# Patient Record
Sex: MALE | Race: BLACK OR AFRICAN AMERICAN | NOT HISPANIC OR LATINO | Employment: PART TIME | URBAN - METROPOLITAN AREA
[De-identification: names, ages, dates, MRNs, and addresses within clinical notes are randomized per-mention and may not be internally consistent; named-entity substitution may affect disease eponyms.]

---

## 2017-03-28 ENCOUNTER — HOSPITAL ENCOUNTER (EMERGENCY)
Facility: HOSPITAL | Age: 42
Discharge: HOME | End: 2017-03-28
Attending: EMERGENCY MEDICINE
Payer: MEDICAID

## 2017-03-28 VITALS
RESPIRATION RATE: 18 BRPM | SYSTOLIC BLOOD PRESSURE: 160 MMHG | OXYGEN SATURATION: 100 % | TEMPERATURE: 99 F | HEIGHT: 66 IN | DIASTOLIC BLOOD PRESSURE: 100 MMHG | BODY MASS INDEX: 29.8 KG/M2 | WEIGHT: 185.44 LBS | HEART RATE: 80 BPM

## 2017-03-28 DIAGNOSIS — N49.2 SCROTAL WALL ABSCESS: Primary | ICD-10-CM

## 2017-03-28 PROCEDURE — 99283 EMERGENCY DEPT VISIT LOW MDM: CPT

## 2017-03-28 PROCEDURE — 99284 EMERGENCY DEPT VISIT MOD MDM: CPT | Mod: ,,, | Performed by: EMERGENCY MEDICINE

## 2017-03-28 RX ORDER — SULFAMETHOXAZOLE AND TRIMETHOPRIM 800; 160 MG/1; MG/1
1 TABLET ORAL 2 TIMES DAILY
Qty: 14 TABLET | Refills: 0 | Status: SHIPPED | OUTPATIENT
Start: 2017-03-28 | End: 2017-04-04

## 2017-03-28 NOTE — DISCHARGE INSTRUCTIONS
Our goal in the emergency department is to always give you outstanding care and exceptional service. You may receive a survey by mail or e-mail in the next week regarding your experience in our ED. We would greatly appreciate your completing and returning the survey. Your feedback provides us with a way to recognize our staff who give very good care and it helps us learn how to improve when your experience was below our aspiration of excellence.     APPLY WARM COMPRESSES TO THE AREA FOR 20-30 MINUTES 3-4 TIMES A DAY.    RETURN IF ANY INCREASED SWELLING, PAIN, REDNESS OR WARMTH.

## 2017-03-28 NOTE — ED PROVIDER NOTES
Encounter Date: 3/28/2017    SCRIBE #1 NOTE: I, Christophe Lujan, am scribing for, and in the presence of, Reagan Strange MD.       History     Chief Complaint   Patient presents with    Abscess     r groin     Review of patient's allergies indicates:  No Known Allergies  HPI Comments: Time seen by provider: 10:42 AM  Note: Attempted to see the pt at 10:30 AM, but he had left the room after only being roomed for 15 minutes.     This is a 42 y.o. male who presents with complaint of scrotal abscess for 2 day duration. Pt had shaved 3 days ago, and first noticed a small bump two days ago. Now he endorses bleeding from the abscess site. Denies any fever, chills or discharge. No further complaints or concerns at this time.         The history is provided by the patient and medical records.    note after I attempted to see the patient and he was not in the room, I called him on his cell phone.  He was outside the emergency department activity unclear but agreed to come back into the emergency department for further evaluation.  Past Medical History:   Diagnosis Date    Asthma      History reviewed. No pertinent surgical history.  History reviewed. No pertinent family history.  Social History   Substance Use Topics    Smoking status: Never Smoker    Smokeless tobacco: None    Alcohol use Yes      Comment: about 5 times a month, none today     Review of Systems   Constitutional: Negative for chills and fever.   Genitourinary: Negative for difficulty urinating, discharge, dysuria and frequency.        (+) Scrotum abscess with bleeding. No discharge       Physical Exam   Initial Vitals   BP Pulse Resp Temp SpO2   03/28/17 0912 03/28/17 0912 03/28/17 0912 03/28/17 0912 03/28/17 0912   160/100 80 18 98.5 °F (36.9 °C) 100 %     Physical Exam    Nursing note and vitals reviewed.  Abdominal: Soft. He exhibits no distension. There is no tenderness. There is no rebound and no guarding.   Genitourinary:   Genitourinary Comments:  Uncircumcised male. Testes are both down and normal. Pt has a small 1/4 cm cyst/folliculitis on the right scrotal wall, minimally tender, no erythema or fluctuance. No cellulitis or regional adenitis. No other lesions are seen in the genital area.    Skin: Skin is warm and dry.         ED Course   Procedures  Labs Reviewed - No data to display          Medical Decision Making:   History:   Old Medical Records: I decided to obtain old medical records.  Initial Assessment:   Given that the pt has recently shaved, this is likely an ingrown hair with a small abscess. Will treat with warm compresses and oral antibiotics. Skin is warm and dry, he is well developed well nourished.               Scribe Attestation:   Scribe #1: I performed the above scribed service and the documentation accurately describes the services I performed. I attest to the accuracy of the note.    Attending Attestation:           Physician Attestation for Scribe:  Physician Attestation Statement for Scribe #1: I, Reagan Strange MD, reviewed documentation, as scribed by Christophe Lujan in my presence, and it is both accurate and complete.                 ED Course     Clinical Impression:   The encounter diagnosis was Scrotal wall abscess.    Disposition:   Disposition: Discharged  Condition: Stable       Reagan Strange MD  03/29/17 1006

## 2017-03-28 NOTE — ED NOTES
Patient identifiers verified and correct for Mr Douglass    C/C: bump on testicle  APPEARANCE: awake and alert in NAD.  SKIN: warm, dry and intact. No breakdown or bruising.  MUSCULOSKELETAL: Patient moving all extremities spontaneously, no obvious swelling or deformities noted. Ambulates independently.  RESPIRATORY: no shortness of breath. Resp unlabored  CARDIAC: heart tones normal. Regular rate and rhythm; 2+ distal pulses; no peripheral edema  ABDOMEN: S/ND/NT, normoactive bowel sounds present in all four quadrants. Normal stool pattern.  : voids spontaneously without difficulty. Denies difficulty  Neurologic: AAO x 4; follows commands equal strength in all extremities; denies numbness/tingling.

## 2017-03-28 NOTE — ED AVS SNAPSHOT
[facility]  [address]  [facility] [address]               Magan Douglass   3/28/2017 10:08 AM   ED    Description:  Male : 1975   Department:  [facility]           Your Care was Coordinated By:     Provider Role From To    Reagan Strange MD Attending Provider 17 1027 --      Reason for Visit     Abscess           Diagnoses this Visit        Comments    Scrotal wall abscess    -  Primary       ED Disposition     ED Disposition Condition Comment    Discharge             To Do List           Follow-up Information     Follow up with [facility] In 3 days.    Why:  If symptoms worsen    Contact information:    [address] [facility] [address]  [phone]         These Medications        Disp Refills Start End    sulfamethoxazole-trimethoprim 800-160mg (BACTRIM DS) 800-160 mg Tab 14 tablet 0 3/28/2017 2017    Take 1 tablet by mouth 2 (two) times daily. - Oral      [facility]     [facility] Care [facility] -  Assistance  Registered nurses in the [facility] provide clinical advisement, health education, appointment booking, and other advisory services.  Call for this free service at [phone].             Medications           Message regarding Medications     Verify the changes and/or additions to your medication regime listed below are the same as discussed with your clinician today.  If any of these changes or additions are incorrect, please notify your healthcare provider.        START taking these NEW medications        Refills    sulfamethoxazole-trimethoprim 800-160mg (BACTRIM DS) 800-160 mg Tab 0    Sig: Take 1 tablet by mouth 2 (two) times daily.    Class: Print    Route: Oral           Verify that the below list of medications is an accurate representation of the medications you are currently taking.  If none reported, the list may be blank. If incorrect, please  "contact your healthcare provider. Carry this list with you in case of emergency.           Current Medications     sulfamethoxazole-trimethoprim 800-160mg (BACTRIM DS) 800-160 mg Tab Take 1 tablet by mouth 2 (two) times daily.           Clinical Reference Information           Your Vitals Were     BP Pulse Temp Resp Height Weight    160/100 80 98.5 °F (36.9 °C) (Oral) 18 5' 6" (1.676 m) 84.1 kg (185 lb 6.5 oz)    SpO2 BMI             100% 29.93 kg/m2         Allergies as of 3/28/2017     No Known Allergies      Immunizations Administered on Date of Encounter - 3/28/2017     None      ED Micro, Lab, POCT     None      ED Imaging Orders     None        Discharge Instructions       Our goal in the emergency department is to always give you outstanding care and exceptional service. You may receive a survey by mail or e-mail in the next week regarding your experience in our ED. We would greatly appreciate your completing and returning the survey. Your feedback provides us with a way to recognize our staff who give very good care and it helps us learn how to improve when your experience was below our aspiration of excellence.     APPLY WARM COMPRESSES TO THE AREA FOR 20-30 MINUTES 3-4 TIMES A DAY.    RETURN IF ANY INCREASED SWELLING, PAIN, REDNESS OR WARMTH.      Discharge References/Attachments     ABSCESS, ANTIBIOTIC TREATMENT ONLY (ENGLISH)      [facility] Sign-Up     Activating your [facility] account is as easy as 1-2-3!     1) Visit [email or web address], select Sign Up Now, enter this activation code and your date of birth, then select Next.  [email or web address]  Expires: 5/12/2017 10:53 AM      2) Create a username and password to use when you visit [facility] in the future and select a security question in case you lose your password and select Next.    3) Enter your e-mail address and click Sign Up!    Additional Information  If you have questions, please e-mail [email or web address] or call [phone] to talk to our " [facility] staff. Remember, [facility] is NOT to be used for urgent needs. For medical emergencies, dial 911.          [facility] complies with applicable Federal civil rights laws and does not discriminate on the basis of race, color, national origin, age, disability, or sex.        Language Assistance Services     ATTENTION: Language assistance services are available, free of charge. Please call [phone].      ATENCIÓN: Si habla manolo, tiene a link disposición servicios gratuitos de asistencia lingüística. Llame al [phone].     CHÚ Ý: N?u b?n nói Ti?ng Vi?t, có các d?ch v? h? tr? ngôn ng? mi?n phí dành cho b?n. G?i s? [phone].

## 2017-06-15 ENCOUNTER — HOSPITAL ENCOUNTER (EMERGENCY)
Facility: HOSPITAL | Age: 42
Discharge: HOME | End: 2017-06-16
Attending: EMERGENCY MEDICINE
Payer: MEDICAID

## 2017-06-15 DIAGNOSIS — E86.0 DEHYDRATION: Primary | ICD-10-CM

## 2017-06-15 LAB
BASOPHILS # BLD AUTO: 0.01 K/UL
BASOPHILS NFR BLD: 0.1 %
DIFFERENTIAL METHOD: ABNORMAL
EOSINOPHIL # BLD AUTO: 0.1 K/UL
EOSINOPHIL NFR BLD: 1.1 %
ERYTHROCYTE [DISTWIDTH] IN BLOOD BY AUTOMATED COUNT: 12.9 %
HCT VFR BLD AUTO: 51 %
HGB BLD-MCNC: 18.2 G/DL
LYMPHOCYTES # BLD AUTO: 3.6 K/UL
LYMPHOCYTES NFR BLD: 34.5 %
MCH RBC QN AUTO: 32.2 PG
MCHC RBC AUTO-ENTMCNC: 35.7 %
MCV RBC AUTO: 90 FL
MONOCYTES # BLD AUTO: 0.9 K/UL
MONOCYTES NFR BLD: 8.9 %
NEUTROPHILS # BLD AUTO: 5.7 K/UL
NEUTROPHILS NFR BLD: 55.3 %
PLATELET # BLD AUTO: 302 K/UL
PMV BLD AUTO: 10.7 FL
RBC # BLD AUTO: 5.65 M/UL
WBC # BLD AUTO: 10.3 K/UL

## 2017-06-15 PROCEDURE — 96360 HYDRATION IV INFUSION INIT: CPT

## 2017-06-15 PROCEDURE — 80053 COMPREHEN METABOLIC PANEL: CPT

## 2017-06-15 PROCEDURE — 99284 EMERGENCY DEPT VISIT MOD MDM: CPT | Mod: ,,, | Performed by: PHYSICIAN ASSISTANT

## 2017-06-15 PROCEDURE — 96361 HYDRATE IV INFUSION ADD-ON: CPT

## 2017-06-15 PROCEDURE — 85025 COMPLETE CBC W/AUTO DIFF WBC: CPT

## 2017-06-15 PROCEDURE — 83690 ASSAY OF LIPASE: CPT

## 2017-06-15 PROCEDURE — 99283 EMERGENCY DEPT VISIT LOW MDM: CPT | Mod: 25

## 2017-06-16 VITALS
SYSTOLIC BLOOD PRESSURE: 163 MMHG | HEIGHT: 66 IN | BODY MASS INDEX: 30.53 KG/M2 | DIASTOLIC BLOOD PRESSURE: 107 MMHG | HEART RATE: 64 BPM | WEIGHT: 190 LBS | RESPIRATION RATE: 18 BRPM | OXYGEN SATURATION: 98 % | TEMPERATURE: 98 F

## 2017-06-16 LAB
ALBUMIN SERPL BCP-MCNC: 4.8 G/DL
ALP SERPL-CCNC: 79 U/L
ALT SERPL W/O P-5'-P-CCNC: 50 U/L
ANION GAP SERPL CALC-SCNC: 12 MMOL/L
AST SERPL-CCNC: 36 U/L
BACTERIA #/AREA URNS AUTO: ABNORMAL /HPF
BILIRUB SERPL-MCNC: 1.2 MG/DL
BILIRUB UR QL STRIP: NEGATIVE
BUN SERPL-MCNC: 40 MG/DL
CALCIUM SERPL-MCNC: 10.9 MG/DL
CHLORIDE SERPL-SCNC: 101 MMOL/L
CLARITY UR REFRACT.AUTO: ABNORMAL
CO2 SERPL-SCNC: 24 MMOL/L
COLOR UR AUTO: YELLOW
CREAT SERPL-MCNC: 1.5 MG/DL
EST. GFR  (AFRICAN AMERICAN): >60 ML/MIN/1.73 M^2
EST. GFR  (NON AFRICAN AMERICAN): 56.2 ML/MIN/1.73 M^2
GLUCOSE SERPL-MCNC: 104 MG/DL
GLUCOSE UR QL STRIP: NEGATIVE
HGB UR QL STRIP: NEGATIVE
HYALINE CASTS UR QL AUTO: 12 /LPF
KETONES UR QL STRIP: ABNORMAL
LEUKOCYTE ESTERASE UR QL STRIP: NEGATIVE
LIPASE SERPL-CCNC: 19 U/L
MICROSCOPIC COMMENT: ABNORMAL
NITRITE UR QL STRIP: NEGATIVE
PH UR STRIP: 5 [PH] (ref 5–8)
POTASSIUM SERPL-SCNC: 4.7 MMOL/L
PROT SERPL-MCNC: 10.2 G/DL
PROT UR QL STRIP: ABNORMAL
RBC #/AREA URNS AUTO: 1 /HPF (ref 0–4)
SODIUM SERPL-SCNC: 137 MMOL/L
SP GR UR STRIP: >=1.03 (ref 1–1.03)
URN SPEC COLLECT METH UR: ABNORMAL
UROBILINOGEN UR STRIP-ACNC: NEGATIVE EU/DL
WBC #/AREA URNS AUTO: 1 /HPF (ref 0–5)

## 2017-06-16 PROCEDURE — 81003 URINALYSIS AUTO W/O SCOPE: CPT

## 2017-06-16 PROCEDURE — 25000003 PHARM REV CODE 250: Performed by: PHYSICIAN ASSISTANT

## 2017-06-16 PROCEDURE — 81001 URINALYSIS AUTO W/SCOPE: CPT

## 2017-06-16 RX ADMIN — SODIUM CHLORIDE 1000 ML: 0.9 INJECTION, SOLUTION INTRAVENOUS at 12:06

## 2017-06-16 NOTE — ED TRIAGE NOTES
Pt presents to ED c/o N/V, and lower abdominal pain that started today after being in the sun for long period of time. Last BM today. Pt denies CP or SOB.     LOC: Patient name and date of birth verified.  The patient is awake, alert and aware of environment with an appropriate affect, the patient is oriented x 3 and speaking appropriately.  Pt in NAD.    APPEARANCE: Patient resting comfortably and in no acute distress, patient is clean and well groomed, patient's clothing is properly fastened.  SKIN: The skin is warm and dry, color consistent with ethnicity, patient has normal skin turgor and moist mucus membranes, skin intact, no breakdown or brusing noted.  MUSCULOSKELETAL: Patient moving all extremities well, no obvious swelling or deformities noted.  RESPIRATORY: Airway is open and patent, respirations are spontaneous, patient has a normal effort and rate, no accessory muscle use noted.  CARDIAC: Patient has a normal rate and rhythm, no periphreal edema noted, capillary refill < 3 seconds.  ABDOMEN: Soft and non tender to palpation, no distention noted. Bowel sounds present in all four quadrants.  NEUROLOGIC: Eyes open spontaneously, behavior appropriate to situation, follows commands, facial expression symmetrical, bilateral hand grasp equal and even, purposeful motor response noted, normal sensation in all extremities when touched with a finger.

## 2017-06-16 NOTE — ED PROVIDER NOTES
Encounter Date: 6/15/2017       History     Chief Complaint   Patient presents with    Multiple medical complaints     Cramping, dehydration, emesis, diarrhea x2 days     Review of patient's allergies indicates:  No Known Allergies  42-year-old male presents to the ER for evaluation dehydration and muscle cramping.  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  He denies any nausea vomiting fever or chills.  She denies any chest pain or shortness of breath.  He has been drinking fluids THROUGHOUT the day but has only urinated one time.  He denies any pain when he urinated.  He denies any changes in his bowels.          Past Medical History:   Diagnosis Date    Asthma      History reviewed. No pertinent surgical history.  History reviewed. No pertinent family history.  Social History   Substance Use Topics    Smoking status: Never Smoker    Smokeless tobacco: Not on file    Alcohol use Yes      Comment: about 5 times a month, none today     Review of Systems   Constitutional: Negative for fever.   HENT: Negative for sore throat.    Respiratory: Negative for shortness of breath.    Cardiovascular: Negative for chest pain.   Gastrointestinal: Negative for nausea.   Genitourinary: Negative for dysuria.   Musculoskeletal: Positive for myalgias. Negative for back pain.   Skin: Negative for rash.   Neurological: Negative for weakness.   Hematological: Does not bruise/bleed easily.       Physical Exam     Initial Vitals [06/15/17 2139]   BP Pulse Resp Temp SpO2   (!) 153/99 110 18 98.1 °F (36.7 °C) 99 %     Physical Exam    Constitutional: Vital signs are normal. He appears well-developed and well-nourished. He is not diaphoretic. No distress.   HENT:   Head: Normocephalic and atraumatic.   Right Ear: External ear normal.   Left Ear: External ear normal.   Nose: Nose normal.   Mouth/Throat: Oropharynx is clear and moist.   Eyes: Conjunctivae and EOM are normal. Pupils are  equal, round, and reactive to light. Right eye exhibits no discharge. Left eye exhibits no discharge.   Cardiovascular: Normal rate, regular rhythm and normal heart sounds. Exam reveals no gallop and no friction rub.    No murmur heard.  Pulmonary/Chest: No respiratory distress. He has no wheezes. He has no rhonchi. He has no rales. He exhibits no tenderness.   Abdominal: Soft. Normal appearance, normal aorta and bowel sounds are normal.   Musculoskeletal: Normal range of motion.   Neurological: He is alert and oriented to person, place, and time.   Skin: Skin is warm and intact.   Psychiatric: He has a normal mood and affect. His speech is normal and behavior is normal. Cognition and memory are normal.         ED Course   Procedures  Labs Reviewed   CBC W/ AUTO DIFFERENTIAL - Abnormal; Notable for the following:        Result Value    Hemoglobin 18.2 (*)     MCH 32.2 (*)     All other components within normal limits   COMPREHENSIVE METABOLIC PANEL - Abnormal; Notable for the following:     BUN, Bld 40 (*)     Creatinine 1.5 (*)     Calcium 10.9 (*)     Total Protein 10.2 (*)     Total Bilirubin 1.2 (*)     ALT 50 (*)     eGFR if non  56.2 (*)     All other components within normal limits   LIPASE   URINALYSIS, REFLEX TO URINE CULTURE             Medical Decision Making:   Differential Diagnosis:   Dehydration, electrolyte imbalance, UTI, anemia  ED Management:  Laboratory analysis reveals no acute findings.  Patient's symptoms improved with IV fluids.  I believe the patient was dehydrated secondary to working outside.  He is stable for discharge.              Attending Attestation:     Physician Attestation Statement for NP/PA:   I discussed this assessment and plan of this patient with the NP/PA, but I did not personally examine the patient. The face to face encounter was performed by the NP/PA.                  ED Course     Clinical Impression:   The encounter diagnosis was  Dehydration.    Disposition:   Disposition: Discharged  Condition: Stable       Brigido Cazares PA-C  06/16/17 0146       Steven Walker MD  06/16/17 0581

## 2018-07-24 ENCOUNTER — HOSPITAL ENCOUNTER (EMERGENCY)
Facility: HOSPITAL | Age: 43
Discharge: HOME | End: 2018-07-25
Attending: EMERGENCY MEDICINE
Payer: MEDICAID

## 2018-07-24 VITALS
TEMPERATURE: 98 F | HEIGHT: 66 IN | SYSTOLIC BLOOD PRESSURE: 129 MMHG | HEART RATE: 79 BPM | WEIGHT: 170 LBS | DIASTOLIC BLOOD PRESSURE: 68 MMHG | OXYGEN SATURATION: 99 % | RESPIRATION RATE: 18 BRPM | BODY MASS INDEX: 27.32 KG/M2

## 2018-07-24 DIAGNOSIS — R07.89 CHEST WALL PAIN: Primary | ICD-10-CM

## 2018-07-24 DIAGNOSIS — R07.9 CHEST PAIN: ICD-10-CM

## 2018-07-24 PROCEDURE — 93010 ELECTROCARDIOGRAM REPORT: CPT | Mod: ,,, | Performed by: INTERNAL MEDICINE

## 2018-07-24 PROCEDURE — 99283 EMERGENCY DEPT VISIT LOW MDM: CPT | Mod: ,,, | Performed by: EMERGENCY MEDICINE

## 2018-07-24 PROCEDURE — 99284 EMERGENCY DEPT VISIT MOD MDM: CPT | Mod: 25

## 2018-07-25 PROCEDURE — 25000003 PHARM REV CODE 250: Performed by: EMERGENCY MEDICINE

## 2018-07-25 RX ORDER — KETOROLAC TROMETHAMINE 10 MG/1
10 TABLET, FILM COATED ORAL
Status: COMPLETED | OUTPATIENT
Start: 2018-07-25 | End: 2018-07-25

## 2018-07-25 RX ORDER — METHOCARBAMOL 500 MG/1
1000 TABLET, FILM COATED ORAL
Status: COMPLETED | OUTPATIENT
Start: 2018-07-25 | End: 2018-07-25

## 2018-07-25 RX ORDER — MELOXICAM 7.5 MG/1
7.5 TABLET ORAL DAILY
Qty: 20 TABLET | Refills: 0 | Status: SHIPPED | OUTPATIENT
Start: 2018-07-25 | End: 2018-09-06

## 2018-07-25 RX ORDER — METHOCARBAMOL 500 MG/1
1000 TABLET, FILM COATED ORAL 3 TIMES DAILY
Qty: 30 TABLET | Refills: 0 | Status: SHIPPED | OUTPATIENT
Start: 2018-07-25 | End: 2018-07-30

## 2018-07-25 RX ORDER — LIDOCAINE 50 MG/G
1 PATCH TOPICAL DAILY
Qty: 15 PATCH | Refills: 0 | Status: SHIPPED | OUTPATIENT
Start: 2018-07-25 | End: 2018-09-06

## 2018-07-25 RX ADMIN — KETOROLAC TROMETHAMINE 10 MG: 10 TABLET, FILM COATED ORAL at 12:07

## 2018-07-25 RX ADMIN — METHOCARBAMOL 1000 MG: 500 TABLET ORAL at 12:07

## 2018-07-25 NOTE — ED NOTES
Pt to ED w/ complaint of right-sided pleuritic chest pain; states it is worse with activity/coughing; denies other symptoms; denies difficulty breathing and trauma to area; confirms previous episodes of similar symptoms

## 2018-07-25 NOTE — ED PROVIDER NOTES
Encounter Date: 7/24/2018    SCRIBE #1 NOTE: I, Diane Anderson, am scribing for, and in the presence of, Dr. Francisco.       History     Chief Complaint   Patient presents with    Chest Pain     Pt c/o right sided chest pain for the past four days. Pt states it hurts when he moves or takes a deep breath.      The patient is a 43 y.o. male with asthma that presents to the ED for evaluation of right sided chest pain x 4 days. The pain is worsened with movement and by coughing. Denies shortness of breath or rash. Denies injury. Patient is a smoker. No further concerns or complaints at this time.      The history is provided by the patient and medical records.     Review of patient's allergies indicates:  No Known Allergies  Past Medical History:   Diagnosis Date    Asthma      No past surgical history on file.  No family history on file.  Social History   Substance Use Topics    Smoking status: Never Smoker    Smokeless tobacco: Not on file    Alcohol use Yes      Comment: about 5 times a month, none today     Review of Systems   Respiratory: Negative for shortness of breath.    Cardiovascular: Positive for chest pain.   Musculoskeletal: Negative for neck pain.   Skin: Negative for rash.   Neurological: Negative for weakness.       Physical Exam     Initial Vitals [07/24/18 2247]   BP Pulse Resp Temp SpO2   129/68 79 18 98.3 °F (36.8 °C) 99 %      MAP       --         Physical Exam    Nursing note and vitals reviewed.  Constitutional: He appears well-developed and well-nourished. He is not diaphoretic. No distress.   HENT:   Head: Normocephalic and atraumatic.   Mouth/Throat: Oropharynx is clear and moist.   Pulmonary/Chest: No respiratory distress.   Right lateral chest wall tenderness.   Neurological: He is alert and oriented to person, place, and time.   Skin: Skin is warm and dry.         ED Course   Procedures  Labs Reviewed - No data to display  EKG Readings: (Independently Interpreted)   Rhythm: Normal Sinus  Rhythm. Heart Rate: 75 bpm. Axis: Normal.   No ST segment changes.        Imaging Results          X-Ray Chest PA And Lateral (Final result)  Result time 07/25/18 00:57:48    Final result by Ruddy Bhatia MD (07/25/18 00:57:48)                 Impression:      No acute cardiopulmonary finding.      Electronically signed by: Ruddy Bhatia MD  Date:    07/25/2018  Time:    00:57             Narrative:    EXAMINATION:  XR CHEST PA AND LATERAL    CLINICAL HISTORY:  Chest Pain;    TECHNIQUE:  Frontal and lateral views of the chest were performed.    COMPARISON:  Chest radiograph, 12/17/2012.    FINDINGS:  Cardiac silhouette is not enlarged. No focal consolidation.  No sizable pleural effusion.  No pneumothorax.  No detrimental change in lung aeration.                              X-Rays:   Independently Interpreted Readings:   Chest X-Ray: Normal heart size.  No infiltrates.  No acute abnormalities.     Medical Decision Making:   History:   Old Medical Records: I decided to obtain old medical records.  Independently Interpreted Test(s):   I have ordered and independently interpreted X-rays - see prior notes.  I have ordered and independently interpreted EKG Reading(s) - see prior notes  Clinical Tests:   Radiological Study: Ordered and Reviewed  Medical Tests: Ordered and Reviewed  ED Management:  Evaluation of right chest wall pain most likely costochondritis or muscle strain. No risk factors for ACS or PE. EKG and Chest X-Ray are unremarkable. Will discharge with anti-inflammatories.             Scribe Attestation:   Scribe #1: I performed the above scribed service and the documentation accurately describes the services I performed. I attest to the accuracy of the note.               Clinical Impression:   The primary encounter diagnosis was Chest wall pain. A diagnosis of Chest pain was also pertinent to this visit.      Disposition:   Disposition: Discharged  Condition: Stable                        Hilari  Cristina Francisco MD  07/25/18 7211

## 2018-09-06 ENCOUNTER — OFFICE VISIT (OUTPATIENT)
Dept: INTERNAL MEDICINE | Facility: CLINIC | Age: 43
End: 2018-09-06
Payer: MEDICAID

## 2018-09-06 ENCOUNTER — LAB VISIT (OUTPATIENT)
Dept: LAB | Facility: HOSPITAL | Age: 43
End: 2018-09-06
Attending: STUDENT IN AN ORGANIZED HEALTH CARE EDUCATION/TRAINING PROGRAM
Payer: MEDICAID

## 2018-09-06 VITALS
WEIGHT: 189.38 LBS | DIASTOLIC BLOOD PRESSURE: 98 MMHG | OXYGEN SATURATION: 99 % | BODY MASS INDEX: 30.44 KG/M2 | HEIGHT: 66 IN | HEART RATE: 69 BPM | SYSTOLIC BLOOD PRESSURE: 150 MMHG

## 2018-09-06 DIAGNOSIS — Z11.3 SCREEN FOR STD (SEXUALLY TRANSMITTED DISEASE): ICD-10-CM

## 2018-09-06 DIAGNOSIS — Z00.00 ANNUAL PHYSICAL EXAM: ICD-10-CM

## 2018-09-06 DIAGNOSIS — B35.3 TINEA PEDIS OF BOTH FEET: ICD-10-CM

## 2018-09-06 DIAGNOSIS — I10 HYPERTENSION, UNSPECIFIED TYPE: Primary | ICD-10-CM

## 2018-09-06 PROCEDURE — 99213 OFFICE O/P EST LOW 20 MIN: CPT | Mod: PBBFAC | Performed by: STUDENT IN AN ORGANIZED HEALTH CARE EDUCATION/TRAINING PROGRAM

## 2018-09-06 PROCEDURE — 99203 OFFICE O/P NEW LOW 30 MIN: CPT | Mod: S$PBB,,, | Performed by: STUDENT IN AN ORGANIZED HEALTH CARE EDUCATION/TRAINING PROGRAM

## 2018-09-06 PROCEDURE — 87491 CHLMYD TRACH DNA AMP PROBE: CPT

## 2018-09-06 PROCEDURE — 99999 PR PBB SHADOW E&M-EST. PATIENT-LVL III: CPT | Mod: PBBFAC,,, | Performed by: STUDENT IN AN ORGANIZED HEALTH CARE EDUCATION/TRAINING PROGRAM

## 2018-09-06 RX ORDER — LISINOPRIL 20 MG/1
20 TABLET ORAL DAILY
Qty: 90 TABLET | Refills: 3 | Status: SHIPPED | OUTPATIENT
Start: 2018-09-06 | End: 2020-02-18 | Stop reason: SDUPTHER

## 2018-09-06 RX ORDER — CLOTRIMAZOLE 1 %
CREAM (GRAM) TOPICAL 2 TIMES DAILY
Qty: 14 G | Refills: 0 | Status: SHIPPED | OUTPATIENT
Start: 2018-09-06 | End: 2023-01-01

## 2018-09-06 RX ORDER — AMOXICILLIN 500 MG/1
500 CAPSULE ORAL
COMMUNITY
End: 2023-01-01

## 2018-09-06 NOTE — PROGRESS NOTES
Clinic Note  9/6/2018      Subjective:       Patient ID:  Magan is a 43 y.o. male being seen for an established visit.    Chief Complaint: Annual Exam    HPI    Magan Douglass is a 44 yo male with asthma who presents for evaluation to establish care and for evaluation of HTN. He states that he has been having dental issues and saw a dentist last week for a tooth extraction. He states that his dentist was unable to perform the procedure due to his systolic blood pressure of 180. He denies chest pain, SOB, vision changes, or headache. His only medications are amoxicillin prescribed by his dentist for a dental infection. He denies illicit drug use but does state that he has access to cocaine and handles it daily. He is afraid that handling cocaine is why his BP is elevated. He is resistant to starting anti-hypertensive medications. In addition, he requests evaluation of the skin between his toes. He states that area does not bother him but is occasional pruitic but would like to get it looked at since he is here for his annual physical. He denies pain, warmth, or drainage from the area. He denies fever, chills, or non-healing ulcers.     He would like to be screening for STDs. He denies any recent exposure to HIV or STDs but states that he wants to be checked out for them. He would also like to be tested for herpes. He denies dysuria, skin rash or unexplained weight loss.    He has a self-reported history of asthma. He has never been on treatment and states that he has not needed it. He denies episodes of shortness of breath, wheezing, or cough.    Review of Systems   Constitutional: Negative for chills, diaphoresis, fever, malaise/fatigue and weight loss.   HENT: Negative for congestion, ear discharge, ear pain, hearing loss, nosebleeds, sinus pain, sore throat and tinnitus.    Eyes: Negative for blurred vision, double vision, photophobia, pain, discharge and redness.   Respiratory: Negative for cough, hemoptysis,  "shortness of breath and wheezing.    Cardiovascular: Negative for chest pain, palpitations, orthopnea and leg swelling.   Gastrointestinal: Negative for abdominal pain, constipation, diarrhea, nausea and vomiting.   Genitourinary: Negative for dysuria, flank pain, hematuria and urgency.   Musculoskeletal: Negative for joint pain and myalgias.   Skin: Negative for itching and rash.   Neurological: Negative for dizziness, seizures, weakness and headaches.   Psychiatric/Behavioral: Negative for depression. The patient does not have insomnia.        Past Medical History:   Diagnosis Date    Asthma        Family History   Problem Relation Age of Onset    Asthma Sister     Asthma Brother         reports that  has never smoked. He does not have any smokeless tobacco history on file. He reports that he drinks alcohol. He reports that he uses drugs. Drugs: Marijuana and Cocaine.    Medication List with Changes/Refills   New Medications    CLOTRIMAZOLE (LOTRIMIN) 1 % CREAM    Apply topically 2 (two) times daily. Apply to affected area twice per day    LISINOPRIL (PRINIVIL,ZESTRIL) 20 MG TABLET    Take 1 tablet (20 mg total) by mouth once daily.   Current Medications    AMOXICILLIN (AMOXIL) 500 MG CAPSULE    Take 500 mg by mouth every 6 to 8 hours as needed.    LIDOCAINE (LIDODERM) 5 %    Place 1 patch onto the skin once daily. Remove & Discard patch within 12 hours or as directed by MD    MELOXICAM (MOBIC) 7.5 MG TABLET    Take 1 tablet (7.5 mg total) by mouth once daily.     Review of patient's allergies indicates:  No Known Allergies            Objective:      BP (!) 150/100 (BP Location: Left arm, Patient Position: Sitting, BP Method: Medium (Manual))   Pulse 69   Ht 5' 6" (1.676 m)   Wt 85.9 kg (189 lb 6 oz)   SpO2 99%   BMI 30.57 kg/m²   Estimated body mass index is 30.57 kg/m² as calculated from the following:    Height as of this encounter: 5' 6" (1.676 m).    Weight as of this encounter: 85.9 kg (189 lb 6 " oz).  Physical Exam   Constitutional: He is oriented to person, place, and time and well-developed, well-nourished, and in no distress. No distress.   HENT:   Head: Normocephalic and atraumatic.   Eyes: Conjunctivae and EOM are normal.   Cardiovascular: Normal rate, regular rhythm, normal heart sounds and intact distal pulses. Exam reveals no gallop and no friction rub.   No murmur heard.  2+ DP pulses bilaterally   Pulmonary/Chest: Effort normal and breath sounds normal. No respiratory distress. He has no wheezes. He exhibits no tenderness.   Abdominal: Soft. Bowel sounds are normal. He exhibits no distension. There is no tenderness. There is no rebound and no guarding.   Musculoskeletal: Normal range of motion. He exhibits no edema.   Neurological: He is alert and oriented to person, place, and time. Gait normal.   Skin: Skin is warm and dry. No rash noted. He is not diaphoretic.   Slight skin breakdown between 2nd and 3rd toe on right foot. No weeping, warm, or purulence noted around area. No skin breakdown, erythema, or purulence noted other areas of foot or between toes bilaterally. No ulcers noted in feet bilaterally.         Assessment and Plan:         Magan was seen today for annual exam.    Diagnoses and all orders for this visit:      Hypertension, unspecified type      -     BP today 150/98 on [facility] BP; Patient denies use but reports daily handling of cocaine and is resistant to starting anti-hypertensives medications      -     After counseling the patient on the risks of untreated HTN such as heart attack, stroke, and possible death, he was amenable to starting medication. Will start on low dose of lisinopril and titrate as necessary. Will schedule 2 week follow up for blood pressure check.      -     lisinopril (PRINIVIL,ZESTRIL) 20 MG tablet; Take 1 tablet (20 mg total) by mouth once daily.      -     Counseled patient on importance of low sodium diet and weight loss in HTN management     Screen for  STD (sexually transmitted disease)  -     HIV-1 and HIV-2 antibodies; Future  -     RPR; Future  -     Hepatitis C antibody; Future  -     C. trachomatis/N. gonorrhoeae by AMP DNA [facility]; Urine; Future    Annual physical exam  -     Lipid panel; Future  -     Comprehensive metabolic panel; Future  -     CBC auto differential; Future  -     Hemoglobin A1c; Future    Tinea pedis  -    Most likely tinea pedis given slight interdigital skin breakdown on right foot and occasional pruritus   -    Counseled patient on need to wear dry footwear and cotton socks to decrease moisture around feet to prevent reoccurrence.     -    clotrimazole (LOTRIMIN) 1 % cream; Apply topically 2 (two) times daily. Apply to affected area twice per day      Follow-up in about 2 weeks (around 9/20/2018).    Other Orders Placed This Visit:  Orders Placed This Encounter   Procedures    C. trachomatis/N. gonorrhoeae by AMP [facility]; Urine    Lipid panel    Comprehensive metabolic panel    CBC auto differential    Hemoglobin A1c    HIV-1 and HIV-2 antibodies    RPR    Hepatitis C antibody         Kaela Merino    Discussed with Dr. Doss

## 2018-09-07 ENCOUNTER — TELEPHONE (OUTPATIENT)
Dept: INTERNAL MEDICINE | Facility: CLINIC | Age: 43
End: 2018-09-07

## 2018-09-07 LAB
C TRACH DNA SPEC QL NAA+PROBE: NOT DETECTED
N GONORRHOEA DNA SPEC QL NAA+PROBE: NOT DETECTED

## 2018-09-07 NOTE — TELEPHONE ENCOUNTER
Called patient to inform him that his recent STD screening was negative and that A1c, CBC, & lipid panel were within normal limits. Also notified him that his CMP had improved from previous in 2017. Patient was amenable and voiced understanding. F/u in 2 weeks for hypertension.      Discussed with Dr. Reese    4:45 PM     Addendum:  Called patient back to emphasize importance for using  safe sex practices such as condoms as patient had previously implied that he planned to have unprotected sex on previous phone call. Warned of danger of kristy blood borne diseases such as HIV through risky sexual practices such as unprotected sex. Patient voiced understanding of risks.     5:00 PM

## 2018-09-10 ENCOUNTER — TELEPHONE (OUTPATIENT)
Dept: INTERNAL MEDICINE | Facility: CLINIC | Age: 43
End: 2018-09-10

## 2018-09-10 NOTE — TELEPHONE ENCOUNTER
----- Message from Laurence Victor sent at 9/6/2018 11:27 AM CDT -----  Myles haddad is in need of a two week f/u with Dr Merino

## 2018-10-01 ENCOUNTER — TELEPHONE (OUTPATIENT)
Dept: INTERNAL MEDICINE | Facility: CLINIC | Age: 43
End: 2018-10-01

## 2018-10-01 NOTE — TELEPHONE ENCOUNTER
He was calling wondering if he needed to be treated , his girlfriend was dx with trichomoniasis , and she is taken oral antibiotics , can you treat him also

## 2018-10-01 NOTE — TELEPHONE ENCOUNTER
----- Message from Asya Meredith sent at 10/1/2018  2:24 PM CDT -----  Contact: Self [phone]  Patient would like to get test results    Name of test (lab, mammo, etc.):   Labs     Date of test:  9/6    Ordering provider: Dr. Merino    [facility]    Would you prefer a response via [employer]?:  N    Comments:  Pt will like to speak to the nurse.

## 2018-10-01 NOTE — TELEPHONE ENCOUNTER
----- Message from Asya Meredith sent at 10/1/2018  2:24 PM CDT -----  Contact: Self [phone]  Patient would like to get test results    Name of test (lab, mammo, etc.):   Labs     Date of test:  9/6    Ordering provider: Dr. Merino    [facility]    Would you prefer a response via [employer]?:  N    Comments:  Pt will like to speak to the nurse.

## 2018-10-01 NOTE — TELEPHONE ENCOUNTER
Girl friend called is being treated for trichomoniasis with antibiotic , please call him he needs treatment also ?

## 2018-10-03 ENCOUNTER — TELEPHONE (OUTPATIENT)
Dept: INTERNAL MEDICINE | Facility: CLINIC | Age: 43
End: 2018-10-03

## 2018-10-03 RX ORDER — METRONIDAZOLE 500 MG/1
500 TABLET ORAL EVERY 12 HOURS
Qty: 14 TABLET | Refills: 0 | Status: SHIPPED | OUTPATIENT
Start: 2018-10-03 | End: 2018-10-10

## 2018-10-03 NOTE — TELEPHONE ENCOUNTER
Spoke with pt  Understands  To take medication , and abstain from intercourse until finished all med's   He also requested an appointment , will come in oct 17

## 2018-10-03 NOTE — TELEPHONE ENCOUNTER
Prescribed Flagyl 500mg BID for 7 days to local pharmacy on file ([facility] [employer]). Called patient to notify of prescription but no answer and unable to leave due to full voicemail box. If patient does call back, please let him know that medication has been prescribed and that he should abstain from sexual intercourse until after he has finished full course of antibiotic to avoid reinfection.

## 2018-10-03 NOTE — TELEPHONE ENCOUNTER
----- Message from Ambika Acosta sent at 10/3/2018  8:35 AM CDT -----  Contact: Patient [phone]  Patient is returning a phone call.  Who left a message for the patient: John  Does patient know what this is regarding:  I told him his rx of flagyl is at the [facility] but, that is all.   Comments: Please call him back to advise of the rest of the message.

## 2018-10-08 NOTE — PROGRESS NOTES
I have reviewed the notes, assessments, and/or procedures performed by Dr. Sheriff, I concur with her/his documentation of Magan Douglass.

## 2019-03-01 ENCOUNTER — HOSPITAL ENCOUNTER (EMERGENCY)
Facility: HOSPITAL | Age: 44
Discharge: HOME | End: 2019-03-01
Attending: EMERGENCY MEDICINE
Payer: MEDICAID

## 2019-03-01 VITALS
TEMPERATURE: 98 F | OXYGEN SATURATION: 99 % | BODY MASS INDEX: 27.32 KG/M2 | HEART RATE: 76 BPM | RESPIRATION RATE: 16 BRPM | DIASTOLIC BLOOD PRESSURE: 102 MMHG | SYSTOLIC BLOOD PRESSURE: 165 MMHG | HEIGHT: 66 IN | WEIGHT: 170 LBS

## 2019-03-01 DIAGNOSIS — R21 RASH: Primary | ICD-10-CM

## 2019-03-01 PROCEDURE — 25000003 PHARM REV CODE 250: Performed by: PHYSICIAN ASSISTANT

## 2019-03-01 PROCEDURE — 96372 THER/PROPH/DIAG INJ SC/IM: CPT

## 2019-03-01 PROCEDURE — 99284 PR EMERGENCY DEPT VISIT,LEVEL IV: ICD-10-PCS | Mod: ,,, | Performed by: PHYSICIAN ASSISTANT

## 2019-03-01 PROCEDURE — 63600175 PHARM REV CODE 636 W HCPCS: Performed by: PHYSICIAN ASSISTANT

## 2019-03-01 PROCEDURE — 99284 EMERGENCY DEPT VISIT MOD MDM: CPT | Mod: ,,, | Performed by: PHYSICIAN ASSISTANT

## 2019-03-01 PROCEDURE — 99284 EMERGENCY DEPT VISIT MOD MDM: CPT | Mod: 25

## 2019-03-01 RX ORDER — FAMOTIDINE 20 MG/1
20 TABLET, FILM COATED ORAL
Status: COMPLETED | OUTPATIENT
Start: 2019-03-01 | End: 2019-03-01

## 2019-03-01 RX ORDER — FAMOTIDINE 20 MG/1
20 TABLET, FILM COATED ORAL 2 TIMES DAILY
Qty: 10 TABLET | Refills: 0 | Status: SHIPPED | OUTPATIENT
Start: 2019-03-01 | End: 2019-03-06

## 2019-03-01 RX ORDER — DIPHENHYDRAMINE HCL 25 MG
25 CAPSULE ORAL
Status: DISCONTINUED | OUTPATIENT
Start: 2019-03-01 | End: 2019-03-01

## 2019-03-01 RX ORDER — METHYLPREDNISOLONE SOD SUCC 125 MG
125 VIAL (EA) INJECTION
Status: COMPLETED | OUTPATIENT
Start: 2019-03-01 | End: 2019-03-01

## 2019-03-01 RX ORDER — DIPHENHYDRAMINE HCL 50 MG
50 CAPSULE ORAL EVERY 6 HOURS
Qty: 20 CAPSULE | Refills: 0 | Status: SHIPPED | OUTPATIENT
Start: 2019-03-01 | End: 2019-03-06

## 2019-03-01 RX ORDER — DIPHENHYDRAMINE HCL 50 MG
50 CAPSULE ORAL
Status: COMPLETED | OUTPATIENT
Start: 2019-03-01 | End: 2019-03-01

## 2019-03-01 RX ORDER — METHYLPREDNISOLONE 4 MG/1
TABLET ORAL
Qty: 1 PACKAGE | Refills: 0 | Status: SHIPPED | OUTPATIENT
Start: 2019-03-01 | End: 2019-03-22

## 2019-03-01 RX ADMIN — METHYLPREDNISOLONE SODIUM SUCCINATE 125 MG: 125 INJECTION, POWDER, FOR SOLUTION INTRAMUSCULAR; INTRAVENOUS at 07:03

## 2019-03-01 RX ADMIN — DIPHENHYDRAMINE HYDROCHLORIDE 50 MG: 50 CAPSULE ORAL at 07:03

## 2019-03-01 RX ADMIN — FAMOTIDINE 20 MG: 20 TABLET ORAL at 07:03

## 2019-03-02 NOTE — DISCHARGE INSTRUCTIONS
Take diphenhydramine/Benadryl every 6 hr.  Take Pepcid every 12 hr.  Start Medrol Dosepak tomorrow morning.  Wash all clothing with hot water and soap. Avoid new body products.     Call and follow up with your primary care physician for further evaluation and management if your symptoms do not improve or worsen.    No future appointments.    Our goal in the emergency department is to always give you outstanding care and exceptional service. You may receive a survey by mail or e-mail in the next week regarding your experience in our ED. We would greatly appreciate your completing and returning the survey. Your feedback provides us with a way to recognize our staff who give very good care and it helps us learn how to improve when your experience was below our aspiration of excellence.

## 2019-03-02 NOTE — ED TRIAGE NOTES
Magan Douglass, a 44 y.o. male presents to the ED via private auto with CC rash and itching to upper extremities bilat, neck, back, and trunk.       Patient identifiers verified verbally with patient and correct for Magan Douglass.    LOC/ APPEARANCE: The patient is AAOx4. Pt is speaking appropriately, no slurred speech.Pt reports pain level of 0. Pt updated on POC.   SKIN: Skin is warm dry and intact, and color is consistent with ethnicity. Capillary refill <3 seconds. No breakdown or brusing visible. Mucus membranes moist, acyanotic. Rash noted above.   RESPIRATORY: Airway is open and patent. Respirations-spontaneous, unlabored, regular rate, equal bilaterally on inspiration and expiration. No accessory muscle use noted.    CARDIAC: No peripheral edema noted, and patient has no c/o chest pain. Peripheral pulses present equal and strong throughout.  ABDOMEN:  Pt has no complaints of abnormal bowel movements, denies blood in stool. Pt reports normal appetite.   NEUROLOGIC: Eyes open spontaneously and facial expression symmetrical. Pt behavior appropriate to situation, and pt follows commands. Pt reports sensation present in all extremities when touched with a finger, denies any numbness or tingling. PERRLA  MUSCULOSKELETAL: Spontaneous movement noted to all extremities. Hand  equal and leg strength strong +5 bilaterally.

## 2019-03-02 NOTE — ED PROVIDER NOTES
Encounter Date: 3/1/2019       History     Chief Complaint   Patient presents with    Rash     to both arms and neck     6:48 PM    Patient is a 44-year-old male with a history of HTN who presents the ED with rash.  Patient states that he noted it this morning.  He has been staying in a hotel for 14 days for work.  He notes that he use their laundry detergent recently, otherwise denies any new body products.  He denies any food allergies.  He complains of itchy rash mainly to his bilateral arms.  He notes some to his abdominal area. He did not take any medication for this. He has no other complaints.           Review of patient's allergies indicates:  No Known Allergies  Past Medical History:   Diagnosis Date    Asthma     Hypertension      History reviewed. No pertinent surgical history.  Family History   Problem Relation Age of Onset    Heart disease Mother     Asthma Sister     Asthma Brother      Social History     Tobacco Use    Smoking status: Never Smoker    Smokeless tobacco: Never Used   Substance Use Topics    Alcohol use: Yes     Comment: about 5 times a month, none today    Drug use: Yes     Types: Marijuana, Cocaine     Comment: MJ 5x per day     Review of Systems   Constitutional: Negative for chills and fever.   HENT: Negative for ear pain and sore throat.    Eyes: Negative for photophobia and pain.   Respiratory: Negative for shortness of breath.    Cardiovascular: Negative for chest pain.   Gastrointestinal: Negative for nausea.   Genitourinary: Negative for dysuria.   Musculoskeletal: Negative for back pain.   Skin: Positive for rash (itchy).   Neurological: Negative for facial asymmetry and weakness.   Hematological: Does not bruise/bleed easily.       Physical Exam     Initial Vitals [03/01/19 1838]   BP Pulse Resp Temp SpO2   (!) 165/102 76 16 98.1 °F (36.7 °C) 99 %      MAP       --         Physical Exam    Vitals reviewed.  Constitutional: He appears well-developed and  well-nourished. He is not diaphoretic.   Pleasant male in NAD and nontoxic appearing.   HENT:   Head: Normocephalic and atraumatic.   Nose: Nose normal.   Eyes: Conjunctivae and EOM are normal.   Neck: Normal range of motion.   Cardiovascular: Normal rate, regular rhythm and normal heart sounds. Exam reveals no friction rub.    No murmur heard.  Pulmonary/Chest: Breath sounds normal. No respiratory distress. He has no wheezes. He has no rales.   Abdominal: Soft. Bowel sounds are normal. He exhibits no distension. There is no tenderness.   Musculoskeletal: Normal range of motion.   Neurological: He is alert and oriented to person, place, and time. He has normal strength. No sensory deficit.   Skin: Skin is warm and dry. Rash noted. Rash is urticarial. No erythema.   Urticaria rash noted to bilateral UE, mainly forearm and hands. Few lesions noted to R lower abd and R back. No signs of cellulitis.    Psychiatric: He has a normal mood and affect. Thought content normal.         ED Course   Procedures  Labs Reviewed - No data to display       Imaging Results    None          Medical Decision Making:   History:   Old Medical Records: I decided to obtain old medical records.  Initial Assessment:   Patient is a hypertensive 44 year male who presents the ED with rash onset this morning.  He has been staying in a hotel for 14 days and use new laundry detergent.  Differential Diagnosis:   Includes but is not limited to dermatitis, urticaria, bug bites.  I do not suspect anaphylaxis.  ED Management:  Given history and physical exam, I will treat patient for urticarial lesions. It is likely that this could be bug bites as well given his risk factor.  Advised that he cleans and washes his close properly when he gets home.  I will give patient Benadryl p.o., Pepcid p.o., and Solu-Medrol IM injection here.  I will provide him with a prescription for Benadryl, Pepcid, and Medrol Dosepak to start tomorrow. He is to follow up with  his PCP if his symptoms do not improve or return to the ED. All questions were answered.   Patient is comfortable with plan and stable for discharge.                      Clinical Impression:       ICD-10-CM ICD-9-CM   1. Rash R21 782.1         Disposition:   Disposition: Discharged  Condition: Stable                        Eunice Horowitz PA-C  03/01/19 5860

## 2019-05-09 ENCOUNTER — HOSPITAL ENCOUNTER (EMERGENCY)
Facility: HOSPITAL | Age: 44
Discharge: HOME | End: 2019-05-09
Attending: EMERGENCY MEDICINE

## 2019-05-09 VITALS
SYSTOLIC BLOOD PRESSURE: 137 MMHG | HEART RATE: 70 BPM | HEIGHT: 66 IN | OXYGEN SATURATION: 99 % | WEIGHT: 187 LBS | RESPIRATION RATE: 16 BRPM | TEMPERATURE: 98 F | DIASTOLIC BLOOD PRESSURE: 94 MMHG | BODY MASS INDEX: 30.05 KG/M2

## 2019-05-09 DIAGNOSIS — L03.019 PARONYCHIA OF FINGER, UNSPECIFIED LATERALITY: Primary | ICD-10-CM

## 2019-05-09 PROCEDURE — 10060 I&D ABSCESS SIMPLE/SINGLE: CPT | Mod: ,,, | Performed by: EMERGENCY MEDICINE

## 2019-05-09 PROCEDURE — 99284 EMERGENCY DEPT VISIT MOD MDM: CPT | Mod: 25,,, | Performed by: EMERGENCY MEDICINE

## 2019-05-09 PROCEDURE — 25000003 PHARM REV CODE 250: Performed by: EMERGENCY MEDICINE

## 2019-05-09 PROCEDURE — 99283 EMERGENCY DEPT VISIT LOW MDM: CPT | Mod: 25

## 2019-05-09 PROCEDURE — 96372 THER/PROPH/DIAG INJ SC/IM: CPT

## 2019-05-09 PROCEDURE — 99284 PR EMERGENCY DEPT VISIT,LEVEL IV: ICD-10-PCS | Mod: 25,,, | Performed by: EMERGENCY MEDICINE

## 2019-05-09 PROCEDURE — 10060 PR DRAIN SKIN ABSCESS SIMPLE: ICD-10-PCS | Mod: ,,, | Performed by: EMERGENCY MEDICINE

## 2019-05-09 RX ORDER — LIDOCAINE HYDROCHLORIDE 10 MG/ML
5 INJECTION, SOLUTION EPIDURAL; INFILTRATION; INTRACAUDAL; PERINEURAL
Status: COMPLETED | OUTPATIENT
Start: 2019-05-09 | End: 2019-05-09

## 2019-05-09 RX ORDER — BACITRACIN ZINC 500 UNIT/G
1 OINTMENT (GRAM) TOPICAL
Status: DISCONTINUED | OUTPATIENT
Start: 2019-05-09 | End: 2019-05-09

## 2019-05-09 RX ORDER — ONDANSETRON 4 MG/1
4 TABLET, ORALLY DISINTEGRATING ORAL
Status: COMPLETED | OUTPATIENT
Start: 2019-05-09 | End: 2019-05-09

## 2019-05-09 RX ADMIN — BACITRACIN ZINC, NEOMYCIN SULFATE, POLYMYXIN B SULFATE 1 EACH: 3.5; 5000; 4 OINTMENT TOPICAL at 10:05

## 2019-05-09 RX ADMIN — LIDOCAINE HYDROCHLORIDE 50 MG: 10 INJECTION, SOLUTION EPIDURAL; INFILTRATION; INTRACAUDAL at 09:05

## 2019-05-09 RX ADMIN — ONDANSETRON 4 MG: 4 TABLET, ORALLY DISINTEGRATING ORAL at 09:05

## 2019-05-09 NOTE — ED NOTES
LOC: The patient is awake, alert, and aware of environment. The patient is oriented x 3 and speaking appropriately.   APPEARANCE: No acute distress noted.   PSYCHOSOCIAL: Patient is calm and cooperative.   SKIN: The skin is warm, dry.   RESPIRATORY: Airway is open and patent. Bilateral chest rise and fall. Respirations are spontaneous, even and unlabored. Normal effort and rate noted. No accessory muscle use noted.   CARDIAC:  Denies chest pain or SOB.   ABDOMEN: Soft and non tender to palpation. No distention noted.   URINARY:  Voids independently.   EXTREMITIES: Pt has swelling noted to the right hand 5th digit.   NEUROLOGIC: Eyes open spontaneously. Speech clear. Tolerating saliva secretions well. Able to follow commands, demonstrating ability to actively and appropriately communicate within context of current conversation. Symmetrical facial muscles. Moving all extremities well. Movement is purposeful.   MUSCULOSKELETAL: No obvious deformities noted.

## 2019-05-09 NOTE — ED TRIAGE NOTES
Pt reports pain and swelling to the right hand fifth finger since Saturday. Pt reports he has been taking ibuprofen but the swelling has gotten worse.

## 2019-05-09 NOTE — ED PROVIDER NOTES
Encounter Date: 5/9/2019       History     Chief Complaint   Patient presents with    Paronychia     HPI   43 Y/O nondiabetic male who reports 2-3 days of gradual swelling around the nail bed of right little finger 1-2 days status post manicure.  He denies any swelling extending proximal be on the the IP joint, and assures no numbness or range of motion deficit to little finger.  He denies similar symptoms in the past.  Otherwise no fever, chills or any other complaints.    Review of patient's allergies indicates:  No Known Allergies  Past Medical History:   Diagnosis Date    Asthma     Hypertension      History reviewed. No pertinent surgical history.  Family History   Problem Relation Age of Onset    Heart disease Mother     Asthma Sister     Asthma Brother      Social History     Tobacco Use    Smoking status: Never Smoker    Smokeless tobacco: Never Used   Substance Use Topics    Alcohol use: Yes     Comment: about 5 times a month, none today    Drug use: Yes     Types: Marijuana, Cocaine     Comment: MJ 5x per day     Review of Systems  CONST: No fever, chills, or fatigue  HEENT: No headache, sore throat, or voice changes  HEART: No pain  LUNG: No SOB, cough, or other complaints  ABDOMEN: No pain, nausea, vomiting, diarrhea, constipation, or flank pain  : No discharge, dysuria, lesions, rashes, masses, sores  EXTREMITIES: FROM with No swelling, or injuries/trauma  SKIN: No lesions, rashes, trauma or other complaints    Physical Exam     Initial Vitals [05/09/19 0855]   BP Pulse Resp Temp SpO2   (!) 170/102 72 15 98.9 °F (37.2 °C) 99 %      MAP       --         Physical Exam    Skin: Skin is dry. Rash noted. Rash is pustular.            PHYSICAL EXAM:  GENERAL: Calm; Cooperative; Well-appearing and Non-Toxic; Well-Nourished; NAD.  HEENT: AT/NC; speaking full sentences  NECK: FROM   HEART: Regular rate and rhythm, no M/G/T.  LUNGS: No Tachypnea, No Retractions, and CTA B/L with no W/R/R.  ABDOMEN:  +BS, Soft, ND, NTTP.   EXTREMITIES: FROM.   VASCULAR: 2+ pulses Prox/Dist & Symmetrical with No delay.  NEUROLOGIC: AAOx3    ED Course   Procedures  Labs Reviewed - No data to display       Imaging Results    None          Medical Decision Making:   Initial Assessment:   Afebrile, atraumatic and hemodynamically stable male with no neurovascular deficits presents with signs and symptoms of right little finger paronychia.  After verbal consent, area cleaned and lidocaine applied releasing about 2-3 mL of purulent material decompressing that abscess.  Area cleaned thoroughly.  Patient developed slight little nausea, but no emesis.  Provided antiemetics, but he denies any other complaints. Generally, procedure tolerated well.\  ____________________  Harry Bates MD, [facility]  Emergency Medicine Staff  9:36 AM 5/9/2019    PROCEDURAL SEDATION:  Indication:  Right little finger paronychia  Informed consent performed. Pre-procedure eval done. risks/benefits discussed. Time out completed.   A total of 2 mg of 1% lidocaine were slowly titrated over procedure to achieve appropriate level of analgesia.  No apparent complications.   ----------------------------------  Harry Bates MD, [facility]  Emergency Medicine Staff  9:38 AM 5/9/2019                      Clinical Impression:       ICD-10-CM ICD-9-CM   1. Paronychia of finger, unspecified laterality L03.019 681.02                                Abdirahman Bates MD  05/09/19 0939

## 2019-09-22 ENCOUNTER — HOSPITAL ENCOUNTER (EMERGENCY)
Facility: HOSPITAL | Age: 44
Discharge: HOME | End: 2019-09-22
Attending: EMERGENCY MEDICINE
Payer: MEDICAID

## 2019-09-22 VITALS
TEMPERATURE: 98 F | HEART RATE: 87 BPM | OXYGEN SATURATION: 99 % | SYSTOLIC BLOOD PRESSURE: 167 MMHG | RESPIRATION RATE: 18 BRPM | DIASTOLIC BLOOD PRESSURE: 90 MMHG

## 2019-09-22 DIAGNOSIS — T63.484A INSECT STINGS, UNDETERMINED INTENT, INITIAL ENCOUNTER: Primary | ICD-10-CM

## 2019-09-22 PROCEDURE — 99281 PR EMERGENCY DEPT VISIT,LEVEL I: ICD-10-PCS | Mod: ,,, | Performed by: PHYSICIAN ASSISTANT

## 2019-09-22 PROCEDURE — 99281 EMR DPT VST MAYX REQ PHY/QHP: CPT

## 2019-09-22 PROCEDURE — 99281 EMR DPT VST MAYX REQ PHY/QHP: CPT | Mod: ,,, | Performed by: PHYSICIAN ASSISTANT

## 2019-09-23 NOTE — ED PROVIDER NOTES
Encounter Date: 9/22/2019       History     Chief Complaint   Patient presents with    Insect Bite     bite by sergeJunie azbeni to left upper arm     44-year-old  male with history of hypertension, asthma presents to the ED complaining of a caterpillar sting that happened 1 hr prior to arrival.  He states that he was outside at his condo when he saw the caterpillar sting him on his left arm.  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  He has not used the EpiPen after the caterpillar sting but presented to the ED because he is unsure if he is also allergic to caterpillar stings.  He reports some nausea and burning at the site of the sting.  He cleaned the area with alcohol prior to arrival  He denies fever, chills, chest pain, facial swelling, throat swelling, tongue swelling, trouble breathing, abdominal pain, numbness, paresthesias.    The history is provided by the patient.     Review of patient's allergies indicates:  No Known Allergies  Past Medical History:   Diagnosis Date    Asthma     Hypertension      History reviewed. No pertinent surgical history.  Family History   Problem Relation Age of Onset    Heart disease Mother     Asthma Sister     Asthma Brother      Social History     Tobacco Use    Smoking status: Never Smoker    Smokeless tobacco: Never Used   Substance Use Topics    Alcohol use: Not Currently     Comment: about 5 times a month, none today    Drug use: Yes     Types: Marijuana     Comment: MJ 5x per day     Review of Systems   Constitutional: Negative for chills and fever.   HENT: Negative for congestion, facial swelling, rhinorrhea, sore throat and trouble swallowing.    Eyes: Negative for photophobia and visual disturbance.   Respiratory: Negative for shortness of breath.    Cardiovascular: Negative for chest pain.   Gastrointestinal: Positive for nausea. Negative for abdominal pain, constipation, diarrhea and vomiting.    Genitourinary: Negative for dysuria and hematuria.   Musculoskeletal: Negative for myalgias.   Skin: Positive for wound.   Neurological: Negative for numbness and headaches.   Psychiatric/Behavioral: Negative for confusion.       Physical Exam     Initial Vitals [09/22/19 2040]   BP Pulse Resp Temp SpO2   (!) 167/90 87 18 98.4 °F (36.9 °C) 99 %      MAP       --         Physical Exam    Nursing note and vitals reviewed.  Constitutional: He appears well-developed and well-nourished. He is not diaphoretic. No distress.   HENT:   Head: Normocephalic and atraumatic.   No facial swelling   Neck: Normal range of motion. Neck supple.   Cardiovascular: Normal rate, regular rhythm and normal heart sounds. Exam reveals no gallop and no friction rub.    No murmur heard.  Pulmonary/Chest: Breath sounds normal. No respiratory distress. He has no wheezes. He has no rhonchi. He has no rales.   Abdominal: Soft. Bowel sounds are normal. There is no tenderness. There is no rebound and no guarding.   Musculoskeletal: Normal range of motion.   Bilateral radial pulses 2+. Normal ROM of the L arm   Neurological: He is alert and oriented to person, place, and time.   Skin: Skin is warm and dry.   whelp noted to the L arm just proximal to the elbow. No surrounding cellulitis. nontender to palpation.    Psychiatric: He has a normal mood and affect.         ED Course   Procedures  Labs Reviewed - No data to display       Imaging Results    None          Medical Decision Making:   History:   Old Medical Records: I decided to obtain old medical records.       APC / Resident Notes:   44-year-old  male with history of hypertension asthma presents to the ED complaining of a caterpillar sting that happened 1 hr prior to arrival.  Vital signs stable. No respiratory distress. No facial swelling.  Whelp noted to the left arm just proximal to the elbow.  No surrounding cellulitis.  Nontender palpation. Bilateral radial pulses 2+.   Normal range of motion of the left arm.  No signs of anaphylaxis at this time.  I do not feel the patient needs any labs or imaging.  Stable for discharge.    He was discharged without any new prescriptions.  He will apply over-the-counter Benadryl or cortisone cream as needed.  He will follow up with his PCP.  All of the patient's questions were answered.  I reviewed the patient's chart and discussed the case with my supervising physician.                    Clinical Impression:       ICD-10-CM ICD-9-CM   1. Insect stings, undetermined intent, initial encounter T63.484A 989.5     E980.9         Disposition:   Disposition: Discharged  Condition: Stable                        Sue Gresham PA-C  09/23/19 0005

## 2019-09-23 NOTE — ED TRIAGE NOTES
"Pt to the ED c/o being bitten by callipillar. Pt has welt to left upper arm. Pt reports "feeling funny"    Patient identifiers verified and correct for Magan Douglass.     LOC: The patient is awake, alert and aware of environment with an appropriate affect, the patient is oriented x 3 and speaking appropriately.   APPEARANCE: Patient appears comfortable and in no acute distress, patient is clean and well groomed.  SKIN: The skin is warm and dry, color consistent with ethnicity, patient has normal skin turgor and moist mucus membranes, skin intact, no breakdown or bruising noted. Welt to left upper arm.   MUSCULOSKELETAL: Patient moving all extremities spontaneously, no swelling noted.  RESPIRATORY: Airway is open and patent, respirations are spontaneous, patient has a normal effort and rate, no accessory muscle use noted.  CARDIAC: Patient has a normal rate and regular rhythm, no edema noted, capillary refill < 3 seconds.   GASTRO: Soft and non tender to palpation, no distention noted.   : Pt denies any pain or frequency with urination.  NEURO: Pt opens eyes spontaneously, behavior appropriate to situation, follows commands, facial expression symmetrical, bilateral hand grasp equal and even, purposeful motor response noted, normal sensation in all extremities when touched with a finger.  "

## 2020-01-15 ENCOUNTER — HOSPITAL ENCOUNTER (EMERGENCY)
Facility: HOSPITAL | Age: 45
Discharge: HOME | End: 2020-01-15
Attending: EMERGENCY MEDICINE
Payer: MEDICAID

## 2020-01-15 VITALS
OXYGEN SATURATION: 100 % | RESPIRATION RATE: 16 BRPM | DIASTOLIC BLOOD PRESSURE: 82 MMHG | HEART RATE: 69 BPM | SYSTOLIC BLOOD PRESSURE: 141 MMHG | TEMPERATURE: 98 F | HEIGHT: 66 IN | WEIGHT: 180 LBS | BODY MASS INDEX: 28.93 KG/M2

## 2020-01-15 DIAGNOSIS — M25.512 ACUTE PAIN OF LEFT SHOULDER: Primary | ICD-10-CM

## 2020-01-15 PROCEDURE — 99284 PR EMERGENCY DEPT VISIT,LEVEL IV: ICD-10-PCS | Mod: ,,, | Performed by: EMERGENCY MEDICINE

## 2020-01-15 PROCEDURE — 99284 EMERGENCY DEPT VISIT MOD MDM: CPT

## 2020-01-15 PROCEDURE — 99284 EMERGENCY DEPT VISIT MOD MDM: CPT | Mod: ,,, | Performed by: EMERGENCY MEDICINE

## 2020-01-15 RX ORDER — NAPROXEN 500 MG/1
500 TABLET ORAL 2 TIMES DAILY WITH MEALS
Qty: 10 TABLET | Refills: 0 | Status: SHIPPED | OUTPATIENT
Start: 2020-01-15 | End: 2020-01-20

## 2020-01-15 RX ORDER — METHOCARBAMOL 500 MG/1
500 TABLET, FILM COATED ORAL 3 TIMES DAILY PRN
Qty: 15 TABLET | Refills: 0 | Status: SHIPPED | OUTPATIENT
Start: 2020-01-15 | End: 2020-01-20

## 2020-01-15 NOTE — ED PROVIDER NOTES
Encounter Date: 1/15/2020    SCRIBE #1 NOTE: I, Belinda Kaur, am scribing for, and in the presence of,  Dr. Raymond Smith. I have scribed the following portions of the note - Other sections scribed: HPI and ROS.       History     Chief Complaint   Patient presents with    Shoulder Pain     Pt reports left shoulder pain x one week. Denies trauma. Denies changes in sensation.     The patient was seen by the provider at 12:58 PM.    The patient is a 44 year old male with a past medical history of asthma and hypertension with complaints of left shoulder pain. The patient reports the left shoulder pain began two weeks ago. He states he woke up with the left shoulder pain and had difficulty moving his left arm. The patient reports the left shoulder pain worsens with movement and when he attempts to lift his left arm. He states he attempted to treat the left shoulder pain with exercises with some relief, but reports the pain returned. The patient denies fevers, chills, nausea, vomiting, and abdominal pain.       The history is provided by the patient and medical records.     Review of patient's allergies indicates:  No Known Allergies  Past Medical History:   Diagnosis Date    Asthma     Hypertension      No past surgical history on file.  Family History   Problem Relation Age of Onset    Heart disease Mother     Asthma Sister     Asthma Brother      Social History     Tobacco Use    Smoking status: Never Smoker    Smokeless tobacco: Never Used   Substance Use Topics    Alcohol use: Not Currently     Comment: about 5 times a month, none today    Drug use: Yes     Types: Marijuana     Comment: MJ 5x per day     Review of Systems   Constitutional: Negative for chills and fever.   HENT: Negative for rhinorrhea and sore throat.    Eyes: Negative for visual disturbance.   Respiratory: Negative for cough and shortness of breath.    Cardiovascular: Negative for chest pain.   Gastrointestinal: Negative for nausea and  vomiting.   Genitourinary: Negative for dysuria and hematuria.   Musculoskeletal: Negative for back pain and neck pain.        + Left shoulder pain   Skin: Negative for rash.   Neurological: Negative for weakness and numbness.   Hematological: Does not bruise/bleed easily.       Physical Exam     Initial Vitals [01/15/20 1242]   BP Pulse Resp Temp SpO2   (!) 141/82 69 16 97.9 °F (36.6 °C) 100 %      MAP       --         Physical Exam    Nursing note and vitals reviewed.      Gen/Constitutional: Interactive. No acute distress  Head: Normocephalic, Atraumatic  Neck: supple, no masses or LAD  Eyes: PERRLA, conjunctiva clear  Ears, Nose and Throat: No rhinorrhea or stridor.  Cardiac: Reg Rhythm, No murmur  Pulmonary: CTA Bilat, no wheezes, rhonchi, rales.  GI: Abdomen soft, non-tender, non-distended; no rebound or guarding  : No CVA tenderness.  Musculoskeletal: Extremities warm, well perfused, no erythema, no edema  Left shoulder:  Negative ejnna test, negative beer can test, pain along the bicipital muscle and rotator cuff muscle.  Able to extend, flex and rotate arm without difficulty.  Sensory intact, fires all muscles appropriately.  Sensory intact to light touch, 2+ distal pulses, no loss of pulse with overhead arm maneuver.  Passive range of motion intact, active range of motion intact.  No bony deformity of the shoulder, no clavicular tenderness..  Skin: No rashes  Neuro: Alert and Oriented x 3; No focal motor or sensory deficits.    Psych: Normal affect    ED Course   Procedures  Labs Reviewed - No data to display       Imaging Results    None          Medical Decision Making:   History:   Old Medical Records: I decided to obtain old medical records.  Initial Assessment:   The patient is a 44 year old male with a past medical history of asthma and hypertension with complaints of left shoulder pain.   Differential Diagnosis:   Differential diagnosis includes but is not limited to:  Muscular strain, muscular  sprain, rotator cuff injury, biceps muscle injury, fracture, dislocation, contusion.       Urgent evaluation of patient presenting with left shoulder pain in the setting of overuse syndrome from work.  Patient does repetitive lifting and repetitive motion with overhead movement.  He is afebrile, vital signs are stable.  Physical exam findings remarkable for tenderness along the bicipital groove and rotator cuff muscles.  No evidence of bony deformity along the shoulder, no tenderness along the deltoid muscle, or along the clinic pectoral groove.  No tenderness along the glenoid or clavicular margin.  Do not suspect fracture or dislocation based on exam and findings.  Suspect likely musculoskeletal injury with overuse.  Instructed patient on range of motion exercises, pendulum exercises, sling for comfort and anti-inflammatories.  Including heating pads or analgesic creams.  Patient will follow up with PCP or orthopedics in 1 week if symptoms do not improve or worsen.  No deficits along the musculature, no nervous or neurovascular deficits.  2+ distal pulses, reflexes intact. Patient agreeable to discharge plan. Strict ED precautions and return instructions discussed at length and patient verbalized understanding. All questions were answered and ample time was given for questions.      Complexity:  Moderate          Scribe Attestation:   Scribe #1: I performed the above scribed service and the documentation accurately describes the services I performed. I attest to the accuracy of the note.    I, Dr. Raymond Smith, personally performed the services described in this documentation. All medical record entries made by the scribe were at my direction and in my presence.  I have reviewed the chart and agree that the record reflects my personal performance and is accurate and complete.                         Clinical Impression:       ICD-10-CM ICD-9-CM   1. Acute pain of left shoulder M25.512 719.41          Disposition:   Disposition: Discharged  Condition: Stable      Raymond Smith DO  Dept of Emergency Medicine   [facility]  Spectralink: [id number]                 Raymond Smith DO  01/16/20 2230

## 2020-01-15 NOTE — ED NOTES
Patient identifiers verified and correct for Magan Douglass.    LOC: The patient is awake, alert and aware of environment with an appropriate affect, the patient is oriented x 4 and speaking appropriately.    APPEARANCE: Patient resting comfortably and in no acute distress, patient is clean and well groomed, patient's clothing is properly fastened.    SKIN: The skin is warm and dry, color consistent with ethnicity, patient has normal skin turgor and moist mucus membranes, skin intact, no breakdown or bruising noted.    MUSCULOSKELETAL: Patient moving all extremities spontaneously, + pain with lifting of left arm + radial pulses bilateral     RESPIRATORY: Airway is open and patent, respirations are spontaneous, patient has a normal effort and rate, no accessory muscle use noted, bilateral breath sounds clear    CARDIAC: Patient has a normal rate and regular rhythm, no periphreal edema noted, capillary refill < 3 seconds.    ABDOMEN: Soft and non tender to palpation, no distention noted, normoactive bowel sounds present in all four quadrants.    NEUROLOGIC:  eyes open spontaneously, behavior appropriate to situation, follows commands, facial expression symmetrical, bilateral hand grasp equal and even, purposeful motor response noted, normal sensation in all extremities when touched with a finger.

## 2020-01-15 NOTE — ED TRIAGE NOTES
Magan Douglass, a 44 y.o. male presents to the ED w/ complaint of left shoulder pain for 2 weeks, reports that it is difficult to lift left arm at times.    Patient unsure of any injury or trauma to the area    Denies numbness or tingling to the left arm     Patient denies chest pain and shortness of breath         Triage note:  Chief Complaint   Patient presents with    Shoulder Pain     Pt reports left shoulder pain x one week. Denies trauma. Denies changes in sensation.     Review of patient's allergies indicates:  No Known Allergies  Past Medical History:   Diagnosis Date    Asthma     Hypertension

## 2020-01-15 NOTE — ED NOTES
Patient discharged home  Discharge instructions given  Patient verbalizes understanding   Patient denies chest pain and shortness of breath   All belongings sent home with patient     Provided sling

## 2020-01-15 NOTE — DISCHARGE INSTRUCTIONS
You may use the anti-inflammatory medication and muscle relaxant for pain. Although, you have been given prescriptions, you may use a heating pad daily to help you with your symptoms. Your symptoms are likely due to your overuse and muscle injury. If you have any numbness, tingling or loss of function follow-up sooner.  You may wear a sling for comfort for only a few days however you must do exercises daily to prevent frozen shoulder.  Follow-up with a primary care listed above if you do not have a primary care physician.

## 2020-01-27 ENCOUNTER — HOSPITAL ENCOUNTER (EMERGENCY)
Facility: HOSPITAL | Age: 45
Discharge: HOME | End: 2020-01-27
Attending: EMERGENCY MEDICINE
Payer: MEDICAID

## 2020-01-27 VITALS
DIASTOLIC BLOOD PRESSURE: 90 MMHG | SYSTOLIC BLOOD PRESSURE: 165 MMHG | HEART RATE: 68 BPM | OXYGEN SATURATION: 99 % | HEIGHT: 66 IN | RESPIRATION RATE: 18 BRPM | TEMPERATURE: 99 F | WEIGHT: 190 LBS | BODY MASS INDEX: 30.53 KG/M2

## 2020-01-27 DIAGNOSIS — M25.512 ACUTE PAIN OF LEFT SHOULDER: Primary | ICD-10-CM

## 2020-01-27 PROCEDURE — 99284 PR EMERGENCY DEPT VISIT,LEVEL IV: ICD-10-PCS | Mod: ,,, | Performed by: EMERGENCY MEDICINE

## 2020-01-27 PROCEDURE — 99284 EMERGENCY DEPT VISIT MOD MDM: CPT | Mod: ,,, | Performed by: EMERGENCY MEDICINE

## 2020-01-27 PROCEDURE — 96372 THER/PROPH/DIAG INJ SC/IM: CPT

## 2020-01-27 PROCEDURE — 99284 EMERGENCY DEPT VISIT MOD MDM: CPT | Mod: 25

## 2020-01-27 PROCEDURE — 63600175 PHARM REV CODE 636 W HCPCS: Performed by: EMERGENCY MEDICINE

## 2020-01-27 RX ORDER — NAPROXEN 500 MG/1
500 TABLET ORAL 2 TIMES DAILY PRN
Qty: 60 TABLET | Refills: 0 | Status: SHIPPED | OUTPATIENT
Start: 2020-01-27 | End: 2020-05-22 | Stop reason: SDUPTHER

## 2020-01-27 RX ORDER — KETOROLAC TROMETHAMINE 30 MG/ML
60 INJECTION, SOLUTION INTRAMUSCULAR; INTRAVENOUS
Status: COMPLETED | OUTPATIENT
Start: 2020-01-27 | End: 2020-01-27

## 2020-01-27 RX ADMIN — KETOROLAC TROMETHAMINE 60 MG: 30 INJECTION, SOLUTION INTRAMUSCULAR; INTRAVENOUS at 09:01

## 2020-01-27 NOTE — ED TRIAGE NOTES
Patient states left shoulder pain x 2 weeks, intermittent, seen in ED for same with med rx, Goody powder this am 0200, no blood pressure meds x 2 weeks.

## 2020-01-27 NOTE — ED NOTES
Patient identifiers verified and correct for MR Douglass  C/C: Shoulder pain SEE NN  APPEARANCE: awake and alert in NAD.  SKIN: warm, dry and intact. No breakdown or bruising.  MUSCULOSKELETAL: Patient moving all extremities spontaneously, no obvious swelling or deformities noted. Ambulates independently.  RESPIRATORY: Denies shortness of breath.Respirations unlabored.   CARDIAC: Denies CP, 2+ distal pulses; no peripheral edema  ABDOMEN: S/ND/NT, Denies nausea  : voids spontaneously, denies difficulty  Neurologic: AAO x 4; follows commands equal strength in all extremities; denies numbness/tingling. Denies dizziness Denies weakness, positive sensation, mvmt, palp radial pulses

## 2020-01-27 NOTE — DISCHARGE INSTRUCTIONS
If you would like to follow up with the [facility] for further care of your fracture, please call the [facility] Scheduling Department at [phone] during business hours.  Please let the  know you need a fracture follow-up appointment with Orthopedics, and you will be scheduled in the Orthopedic Clinic.  Please bring your original Emergency Department discharge papers with  you to the clinic appointment.    Our goal in the emergency department is to always give you outstanding care and exceptional service. You may receive a survey by mail or e-mail in the next week regarding your experience in our ED. We would greatly appreciate your completing and returning the survey. Your feedback provides us with a way to recognize our staff who give very good care and it helps us learn how to improve when your experience was below our aspiration of excellence.

## 2020-01-27 NOTE — ED PROVIDER NOTES
Encounter Date: 1/27/2020    SCRIBE #1 NOTE: I, Belinda Kaur, am scribing for, and in the presence of,  Dr. João Green. I have scribed the following portions of the note - the Resident attestation. Other sections scribed: Shoulder X-ray.       History     Chief Complaint   Patient presents with    Shoulder Pain     left shoulder pain seen 2 weeks ago, still having pain, decreased ROM      44 year old male with history of HTN who presents to the ED for left shoulder pain. Patient reports that 3-4 weeks ago, patient noted that his left shoulder began throbbing with occasional sharp pains with specific movements such as shoulder abduction, flexion, and elbow supination. This has been limiting his work. He denies any trauma. He was seen in the ED on 1/15 for this issue and was treated with NSAIDs and muscle relaxants and was recommended to rest left shoulder and follow-up with PCP or orthopedics. Pain was initially improved with NSAIDs and ROM exercises and he was able to work without limitations for a few days, but yesterday pain became worse again. He has reached out to [facility] ortho who have scheduled him for follow-up in March, which he says is not soon enough. He denies any other symptoms including fevers, chills, chest pains, other joint issues.      Review of patient's allergies indicates:  No Known Allergies  Past Medical History:   Diagnosis Date    Asthma     Hypertension      History reviewed. No pertinent surgical history.  Family History   Problem Relation Age of Onset    Heart disease Mother     Asthma Sister     Asthma Brother      Social History     Tobacco Use    Smoking status: Never Smoker    Smokeless tobacco: Never Used   Substance Use Topics    Alcohol use: Not Currently     Comment: about 5 times a month, none today    Drug use: Yes     Types: Marijuana     Comment: MJ 5x per day     Review of Systems   Constitutional: Negative for chills, fatigue and fever.   Respiratory: Negative for chest  tightness and shortness of breath.    Cardiovascular: Negative for chest pain and palpitations.   Gastrointestinal: Negative for constipation.        No bowel changes   Genitourinary: Negative for frequency.        No urinary changes   Musculoskeletal: Positive for arthralgias and myalgias. Negative for joint swelling, neck pain and neck stiffness.   Skin: Negative for wound.       Physical Exam     Initial Vitals [01/27/20 0841]   BP Pulse Resp Temp SpO2   (!) 154/84 76 18 97.6 °F (36.4 °C) 100 %      MAP       --         Physical Exam    Nursing note and vitals reviewed.  Constitutional: He appears well-developed and well-nourished. He is not diaphoretic. No distress.   Neck: Normal range of motion. Neck supple.   Cardiovascular: Normal rate, regular rhythm, normal heart sounds and intact distal pulses.   No murmur heard.  Pulmonary/Chest: Breath sounds normal. No respiratory distress.   Musculoskeletal: He exhibits tenderness. He exhibits no edema.   Pain on shoulder abduction past 90 degrees but able to slowly perform. Also pain on supination and elbow flexion, tenderness to palpation in bicipital groove. No bony tenderness or deformity in the shoulder or elbow. Normal strength on shoulder abduction, adduction, pronation, extension, but limited to pain on flexion, supination and abduction. Distally neurovascularly intact   Neurological: He is alert and oriented to person, place, and time. No sensory deficit (sensation intact distal to shoulder).   Skin: Skin is warm and dry. No rash noted.         ED Course   Procedures  Labs Reviewed - No data to display       Imaging Results          X-Ray Shoulder 2 or More Views Left (Final result)  Result time 01/27/20 09:47:25    Final result by Selvin Glaser MD (01/27/20 09:47:25)                 Impression:      No acute or significant bony findings.      Electronically signed by: Selvin Glaser  Date:    01/27/2020  Time:    09:47             Narrative:     EXAMINATION:  XR SHOULDER COMPLETE 2 OR MORE VIEWS LEFT    CLINICAL HISTORY:  left shoulder pain;    TECHNIQUE:  Two or three views of the left shoulder were performed.    COMPARISON:  None    FINDINGS:  No fracture.  No malalignment.  No findings calcific tendonitis.  Preserved glenohumeral and acromioclavicular articulations.                              X-Rays:   Independently Interpreted Readings:   Other Readings:  Xray shows no fracture or dislocation    Medical Decision Making:   Initial Assessment:   44 year old male with repeat visit for left shoulder pain. 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 Differential includes but not limited to biceps tendon tear/tendinitis, rotator cuff tear, muscle strain/overuse injury.    No obvious bony deformity, swelling, severe weakness noted on exam.     Will perform X-ray left shoulder as well as administer toradol IM. X ray did not reveal any obvious deformities, dislocation, or fracture.     Will continue to recommend management with NSAIDs, ROM exercises, and follow-up with orthopedics at [facility].     Independently Interpreted Test(s):   I have ordered and independently interpreted X-rays - see summary below.            Scribe Attestation:   Scribe #1: I performed the above scribed service and the documentation accurately describes the services I performed. I attest to the accuracy of the note.    Attending Attestation:   Physician Attestation Statement for Resident:  As the supervising MD   Physician Attestation Statement: I have personally seen and examined this patient.   I agree with the above history. -:   As the supervising MD I agree with the above PE.    As the supervising MD I agree with the above treatment, course, plan, and disposition.                            Clinical Impression:     1. Acute pain of left shoulder          Disposition:   Disposition: Discharged  Condition: Stable       Job Doran  MD  Resident  01/27/20 0955           Job Doran MD  Resident  01/27/20 1009       João Green MD  01/27/20 5120

## 2020-02-12 ENCOUNTER — PATIENT OUTREACH (OUTPATIENT)
Dept: EMERGENCY MEDICINE | Facility: HOSPITAL | Age: 45
End: 2020-02-12

## 2020-02-12 ENCOUNTER — HOSPITAL ENCOUNTER (EMERGENCY)
Facility: HOSPITAL | Age: 45
Discharge: LEFT BEFORE DISCHARGE | End: 2020-02-12
Attending: EMERGENCY MEDICINE
Payer: MEDICAID

## 2020-02-12 VITALS
BODY MASS INDEX: 30.53 KG/M2 | DIASTOLIC BLOOD PRESSURE: 78 MMHG | HEIGHT: 66 IN | RESPIRATION RATE: 16 BRPM | WEIGHT: 190 LBS | HEART RATE: 66 BPM | SYSTOLIC BLOOD PRESSURE: 132 MMHG | TEMPERATURE: 98 F | OXYGEN SATURATION: 98 %

## 2020-02-12 DIAGNOSIS — R10.9 ABDOMINAL PAIN, UNSPECIFIED ABDOMINAL LOCATION: Primary | ICD-10-CM

## 2020-02-12 PROCEDURE — 99283 EMERGENCY DEPT VISIT LOW MDM: CPT

## 2020-02-12 PROCEDURE — 99284 EMERGENCY DEPT VISIT MOD MDM: CPT | Mod: ,,, | Performed by: EMERGENCY MEDICINE

## 2020-02-12 PROCEDURE — 99284 PR EMERGENCY DEPT VISIT,LEVEL IV: ICD-10-PCS | Mod: ,,, | Performed by: EMERGENCY MEDICINE

## 2020-02-12 PROCEDURE — 25000003 PHARM REV CODE 250: Performed by: EMERGENCY MEDICINE

## 2020-02-12 RX ORDER — ACETAMINOPHEN 500 MG
1000 TABLET ORAL
Status: COMPLETED | OUTPATIENT
Start: 2020-02-12 | End: 2020-02-12

## 2020-02-12 RX ADMIN — ACETAMINOPHEN 1000 MG: 500 TABLET ORAL at 09:02

## 2020-02-12 NOTE — Clinical Note
Patient denied any needs at this time. Patient states he wanted the contact number to reach out to Medicaid to verify he still had coverage. I gave him th number from the Medicaid website, [facility] 24/7 Nurse triage line, and [facility] ([facility]: [facility], [facility], etc.) clinic locations.

## 2020-02-12 NOTE — TELEPHONE ENCOUNTER
I have reviewed the notes and assessments performed by Mar Carbone, I concur with her documentation of Magan Douglass. I personally did not meet with or speak to Patient.

## 2020-02-12 NOTE — ED PROVIDER NOTES
Encounter Date: 2/12/2020       History     Chief Complaint   Patient presents with    Skin Problem     states rash on trunk, tender     45-year-old male history of asthma and hypertension, here with multiple complaints. Complaining of a rash, to his torso and inner thighs and lower legs, mildly pruritic, times at least a week.  Patient did have eczema earlier in life this does feel similar.  No new drugs or exposures to anything.  No systemic symptoms. Patient is also complaining of left flank pain, intermittent, a pulsing sensation x1 week.  No urinary symptoms, no nausea, vomiting, diarrhea, fevers, chills. Normal BMs.    The history is provided by the patient.     Review of patient's allergies indicates:  No Known Allergies  Past Medical History:   Diagnosis Date    Asthma     Hypertension      History reviewed. No pertinent surgical history.  Family History   Problem Relation Age of Onset    Heart disease Mother     Asthma Sister     Asthma Brother      Social History     Tobacco Use    Smoking status: Never Smoker    Smokeless tobacco: Never Used   Substance Use Topics    Alcohol use: Not Currently     Comment: about 5 times a month, none today    Drug use: Not Currently     Types: Marijuana     Comment: MJ 5x per day     Review of Systems   Constitutional: Negative for chills, diaphoresis, fatigue and fever.   Respiratory: Negative for chest tightness and shortness of breath.    Gastrointestinal: Positive for abdominal pain. Negative for blood in stool, constipation, diarrhea, nausea, rectal pain and vomiting.   Genitourinary: Negative for difficulty urinating and hematuria.       Physical Exam     Initial Vitals [02/12/20 0840]   BP Pulse Resp Temp SpO2   132/78 66 16 97.8 °F (36.6 °C) 98 %      MAP       --         Physical Exam    Nursing note and vitals reviewed.  Constitutional: He appears well-developed and well-nourished. He is not diaphoretic. No distress.   HENT:   Mouth/Throat:  Oropharynx is clear and moist.   Eyes: Conjunctivae are normal.   Neck: Neck supple.   Cardiovascular: Normal rate.   Pulmonary/Chest: No respiratory distress.   Abdominal: Soft. He exhibits no distension and no mass. There is no tenderness. There is no rebound and no guarding.   Musculoskeletal: He exhibits no edema.   Neurological: He is alert and oriented to person, place, and time.   Skin: Skin is warm and dry.   Patches of raised, papular lesions to lateral chest wall/anterior axillary line and inner thighs.  Symmetric.  No erythema/induration/tenderness         ED Course   Procedures  Labs Reviewed   URINALYSIS, REFLEX TO URINE CULTURE          Imaging Results    None          Medical Decision Making:   History:   Old Medical Records: I decided to obtain old medical records.  Initial Assessment:   44 yo m,    1. Rash, looks c/w mild eczema vs dry skin vs nonspecific dermatitis  -no signs infection or red flags to suggest dangerous process    2. Left flank pain.  Seems musculoskeletal.  No hematuria/urinary sx to suggest stone/UTI.  No abd tenderness to suggest proces like diverticulitis or splenic issue.    -UA and PO pain control  -currently no indication for emergent imaging  Clinical Tests:   Lab Tests: Ordered and Reviewed  ED Management:  Informed by nurse that pt eloped from the ED                   ED Course as of Feb 13 1019   Wed Feb 12, 2020   1204 UA negative.  Will d/c home    [AS]      ED Course User Index  [AS] Aylin Childers MD                Clinical Impression:       ICD-10-CM ICD-9-CM   1. Abdominal pain, unspecified abdominal location R10.9 789.00                             Aylin Childers MD  02/13/20 1019

## 2020-02-12 NOTE — ED NOTES
Patient identifiers verified and correct for Mr Douglass  C/C: rash SEE NN  APPEARANCE: awake and alert in NAD.  SKIN: warm, dry pinpoint rash noted to right and left flank, right upper thigh Denies itching  MUSCULOSKELETAL: Patient moving all extremities spontaneously, no obvious swelling or deformities noted. Ambulates independently.  RESPIRATORY: Denies shortness of breath.Respirations unlabored.   CARDIAC: Denies CP, 2+ distal pulses; no peripheral edema  ABDOMEN: S/ND/NT, Denies nausea  : voids spontaneously, denies difficulty  Neurologic: AAO x 4; follows commands equal strength in all extremities; denies numbness/tingling. Denies dizziness denies weakness

## 2020-02-12 NOTE — ED TRIAGE NOTES
Patient states rash to right and  left flank onset 2-3 days PTA. Pinpoint rash to right upper thigh, No Benadryl

## 2020-02-13 ENCOUNTER — HOSPITAL ENCOUNTER (EMERGENCY)
Facility: HOSPITAL | Age: 45
Discharge: LEFT BEFORE DISCHARGE | End: 2020-02-13
Attending: EMERGENCY MEDICINE
Payer: MEDICAID

## 2020-02-13 VITALS
BODY MASS INDEX: 31.82 KG/M2 | OXYGEN SATURATION: 99 % | DIASTOLIC BLOOD PRESSURE: 120 MMHG | TEMPERATURE: 98 F | HEIGHT: 66 IN | RESPIRATION RATE: 16 BRPM | WEIGHT: 198 LBS | HEART RATE: 68 BPM | SYSTOLIC BLOOD PRESSURE: 174 MMHG

## 2020-02-13 DIAGNOSIS — R10.9 LEFT FLANK PAIN: Primary | ICD-10-CM

## 2020-02-13 LAB
BUN SERPL-MCNC: 16 MG/DL (ref 6–30)
CHLORIDE SERPL-SCNC: 103 MMOL/L (ref 95–110)
CREAT SERPL-MCNC: 0.9 MG/DL (ref 0.5–1.4)
GLUCOSE SERPL-MCNC: 87 MG/DL (ref 70–110)
HCT VFR BLD CALC: 48 %PCV (ref 36–54)
POC IONIZED CALCIUM: 1.2 MMOL/L (ref 1.06–1.42)
POC TCO2 (MEASURED): 28 MMOL/L (ref 23–29)
POTASSIUM BLD-SCNC: 4.3 MMOL/L (ref 3.5–5.1)
SAMPLE: NORMAL
SODIUM BLD-SCNC: 140 MMOL/L (ref 136–145)

## 2020-02-13 PROCEDURE — 99284 PR EMERGENCY DEPT VISIT,LEVEL IV: ICD-10-PCS | Mod: ,,, | Performed by: EMERGENCY MEDICINE

## 2020-02-13 PROCEDURE — 25000003 PHARM REV CODE 250: Performed by: EMERGENCY MEDICINE

## 2020-02-13 PROCEDURE — 99283 EMERGENCY DEPT VISIT LOW MDM: CPT | Mod: 25

## 2020-02-13 PROCEDURE — 99284 EMERGENCY DEPT VISIT MOD MDM: CPT | Mod: ,,, | Performed by: EMERGENCY MEDICINE

## 2020-02-13 PROCEDURE — 80047 BASIC METABLC PNL IONIZED CA: CPT

## 2020-02-13 RX ORDER — ACETAMINOPHEN 500 MG
1000 TABLET ORAL
Status: COMPLETED | OUTPATIENT
Start: 2020-02-13 | End: 2020-02-13

## 2020-02-13 RX ADMIN — ACETAMINOPHEN 1000 MG: 500 TABLET ORAL at 10:02

## 2020-02-13 NOTE — ED NOTES
Received report from intake RN. Pt ambulated to RWR with out assistance. Pt sitting comfortable in chair. Updated pt on plan of care.

## 2020-02-13 NOTE — ED NOTES
Patient identifiers verified and correct for Magan Douglass   LOC: The patient is awake, alert and aware of environment with an appropriate affect, the patient is oriented x 3 and speaking appropriately.   APPEARANCE: Patient appears comfortable and in no acute distress, patient is clean and well groomed.  SKIN: The skin is warm and dry, color consistent with ethnicity, patient has normal skin turgor and moist mucus membranes, skin intact, no breakdown or bruising noted.   MUSCULOSKELETAL: Patient moving all extremities spontaneously, no swelling noted. Pt reports left flank pain.  RESPIRATORY: Airway is open and patent, respirations are spontaneous, patient has a normal effort and rate, no accessory muscle use noted, pt placed on continuous pulse ox with O2 sats noted at 97% on room air.  CARDIAC: Pt placed on cardiac monitor. Patient has a normal rate and regular rhythm, no edema noted, capillary refill < 3 seconds.   GASTRO: Soft and non tender to palpation, no distention noted, normoactive bowel sounds present in all four quadrants. Pt states bowel movements have been regular.  : Pt denies any pain or frequency with urination.  NEURO: Pt opens eyes spontaneously, behavior appropriate to situation, follows commands, facial expression symmetrical, bilateral hand grasp equal and even, purposeful motor response noted, normal sensation in all extremities when touched with a finger.

## 2020-02-14 NOTE — TELEPHONE ENCOUNTER
----- Message from Randy Meade sent at 2/14/2020  9:08 AM CST -----  Contact: Pt  Patient called requesting to have his Rx lisinopril (PRINIVIL,ZESTRIL) 20 MG tablet refilled       [id number] - [place]  [address]  AT [facility] [address]  [address] [place] [address]  Phone: [phone] Fax: [phone]

## 2020-02-18 RX ORDER — LISINOPRIL 20 MG/1
20 TABLET ORAL DAILY
Qty: 90 TABLET | Refills: 0 | OUTPATIENT
Start: 2020-02-18 | End: 2021-02-17

## 2020-02-18 RX ORDER — LISINOPRIL 20 MG/1
20 TABLET ORAL DAILY
Qty: 90 TABLET | Refills: 0 | Status: SHIPPED | OUTPATIENT
Start: 2020-02-18 | End: 2021-03-26 | Stop reason: SDUPTHER

## 2020-03-10 ENCOUNTER — TELEPHONE (OUTPATIENT)
Dept: SPORTS MEDICINE | Facility: CLINIC | Age: 45
End: 2020-03-10

## 2020-03-10 NOTE — TELEPHONE ENCOUNTER
Spoke to the patient explained that Dr. Duffy will no longer be available on 3/16. Rescheduled patient's appt to 3/16 at 11:30a with Dr. Duffy

## 2020-03-18 ENCOUNTER — OFFICE VISIT (OUTPATIENT)
Dept: SPORTS MEDICINE | Facility: CLINIC | Age: 45
End: 2020-03-18
Payer: MEDICAID

## 2020-03-18 ENCOUNTER — TELEPHONE (OUTPATIENT)
Dept: SPORTS MEDICINE | Facility: CLINIC | Age: 45
End: 2020-03-18

## 2020-03-18 VITALS
BODY MASS INDEX: 31.82 KG/M2 | WEIGHT: 198 LBS | SYSTOLIC BLOOD PRESSURE: 128 MMHG | DIASTOLIC BLOOD PRESSURE: 84 MMHG | HEART RATE: 78 BPM | HEIGHT: 66 IN

## 2020-03-18 DIAGNOSIS — M24.812 INTERNAL DERANGEMENT OF LEFT SHOULDER: Primary | ICD-10-CM

## 2020-03-18 DIAGNOSIS — M25.512 ACUTE PAIN OF LEFT SHOULDER: ICD-10-CM

## 2020-03-18 DIAGNOSIS — S43.432A SUPERIOR GLENOID LABRUM LESION OF LEFT SHOULDER, INITIAL ENCOUNTER: ICD-10-CM

## 2020-03-18 PROCEDURE — 99999 PR PBB SHADOW E&M-EST. PATIENT-LVL IV: ICD-10-PCS | Mod: PBBFAC,,, | Performed by: ORTHOPAEDIC SURGERY

## 2020-03-18 PROCEDURE — 99204 OFFICE O/P NEW MOD 45 MIN: CPT | Mod: S$PBB,,, | Performed by: ORTHOPAEDIC SURGERY

## 2020-03-18 PROCEDURE — 99214 OFFICE O/P EST MOD 30 MIN: CPT | Mod: PBBFAC | Performed by: ORTHOPAEDIC SURGERY

## 2020-03-18 PROCEDURE — 99999 PR PBB SHADOW E&M-EST. PATIENT-LVL IV: CPT | Mod: PBBFAC,,, | Performed by: ORTHOPAEDIC SURGERY

## 2020-03-18 PROCEDURE — 99204 PR OFFICE/OUTPT VISIT, NEW, LEVL IV, 45-59 MIN: ICD-10-PCS | Mod: S$PBB,,, | Performed by: ORTHOPAEDIC SURGERY

## 2020-03-18 RX ORDER — MELOXICAM 15 MG/1
15 TABLET ORAL DAILY
Qty: 30 TABLET | Refills: 2 | Status: SHIPPED | OUTPATIENT
Start: 2020-03-18 | End: 2020-04-17

## 2020-03-18 NOTE — PATIENT INSTRUCTIONS
SLAP Lesion of the Shoulder Joint  The shoulder is the most mobile joint in your body. The ball (or head) of the arm bone (humerus) rests in a shallow socket called the glenoid, much like a golf ball fits on a inez. To help make the socket deeper, the outer rim of the glenoid is ringed by tough, flexible tissue called the labrum.    What Is a SLAP Lesion?  SLAP stands for Superior Labrum Anterior to Posterior. This means that the upper rim of the labrum has been torn from front to back. The tear happens where the biceps tendon anchors to the labrum. Common causes of a SLAP lesion include:  · Falling on an outstretched hand  · Forceful lifting  · Repeated overhead motions (such as throwing)  Diagnosing a SLAP lesion  To diagnose the problem, your healthcare provider will examine your arm and shoulder. This includes moving your arm in certain directions to test for symptoms. Imaging tests, such as an MRI, arthrogram, or CT scan, may also be done to evaluate the lesion and to look for other injuries like fractures or rotator cuff tears. These provide your healthcare provider with a detailed view of the tissues inside your shoulder joint.   Treating a SLAP lesion  Rest and anti-inflammatory medicines are often the first line of treatment. Physical therapy can also be used to strengthen the muscles in the shoulder. This helps keep the joint stable. If these treatments arent enough, your healthcare provider may recommend surgery to repair the labrum.  Date Last Reviewed: 10/12/2015  © 8429-8781 [employer]. [address] [place] [address], [place] [address]. All rights reserved. This information is not intended as a substitute for professional medical care. Always follow your healthcare professional's instructions.

## 2020-03-18 NOTE — PROGRESS NOTES
Subjective:          Chief Complaint: Magan Douglass is a 45 y.o. male who had concerns including Pain of the Left Shoulder and Pain of the Right Shoulder.    Magan Douglass is a right handed on [employer].The pain started 2 months ago and is becoming progressively worse. Pain is located over (points to) left shoulder. He reports that the pain is a 4 /10 sharp pain today and not responding adequately to conservative measures which have included activity modifications, rest, and oral medication.   It began during coitus and caused him to go to ER x2, where naproxen was given, which provides some relief.  Is affecting ADLs and limiting desired level of activity. Denies numbness, tingling, radiation, and neck pain or radicular symptoms.  Pain is 8 /10 at its worst    Mechanical symptoms:  Subjective instability: (--)   Worse with activity and ROM above head  Better with rest.   Nocturnal symptoms: (+)    No previous surgeries or trauma on left shoulder            Review of Systems   Constitution: Negative for chills and fever.   HENT: Negative for congestion and sore throat.    Eyes: Negative for discharge and double vision.   Cardiovascular: Negative for chest pain, palpitations and syncope.   Respiratory: Negative for cough and shortness of breath.    Endocrine: Negative for cold intolerance and heat intolerance.   Skin: Negative for dry skin and rash.   Musculoskeletal: Positive for joint pain. Negative for falls, gout and joint swelling.   Gastrointestinal: Negative for abdominal pain, nausea and vomiting.   Neurological: Negative for focal weakness, numbness and paresthesias.       Pain Related Questions  Over the past 3 days, what was your average pain during activity? (I.e. running, jogging, walking, climbing stairs, getting dressed, ect.): 7  Over the past 3 days, what was your highest pain level?: 8  Over the past 3 days, what was your lowest pain level? : 6    Other  How many nights a week are you awakened by  your affected body part?: 6  Was the patient's HEIGHT measured or patient reported?: Patient Reported  Was the patient's WEIGHT measured or patient reported?: Measured      Objective:        General: Magan is well-developed, well-nourished, appears stated age, in no acute distress, alert and oriented to time, place and person.     General    Vitals reviewed.  Constitutional: He is oriented to person, place, and time. He appears well-developed and well-nourished. No distress.   HENT:   Nose: Nose normal.   Mouth/Throat: No oropharyngeal exudate.   Eyes: Pupils are equal, round, and reactive to light. Right eye exhibits no discharge. Left eye exhibits no discharge.   Neck: Normal range of motion.   Cardiovascular: Normal rate, normal heart sounds and intact distal pulses.    Pulmonary/Chest: Effort normal and breath sounds normal. No respiratory distress.   Neurological: He is alert and oriented to person, place, and time. He has normal reflexes. He displays normal reflexes. No cranial nerve deficit. Coordination normal.   Psychiatric: He has a normal mood and affect. His behavior is normal. Judgment and thought content normal.         Right Shoulder Exam     Inspection/Observation   Swelling: absent  Bruising: absent  Scars: absent  Deformity: absent  Scapular Winging: absent  Scapular Dyskinesia: negative  Atrophy: absent    Tenderness   The patient is experiencing no tenderness.    Range of Motion   Active abduction:  90 normal   Passive abduction:  100 normal   Extension:  0 normal   Forward Flexion:  180 normal   Forward Elevation: 180 normal  Adduction: 40 normal  External Rotation 0 degrees:  60 normal   External Rotation 90 degrees: 90 normal  Internal rotation 0 degrees:  T12 normal   Internal rotation 90 degrees:  0 normal     Tests & Signs   Apprehension: negative  Cross arm: negative  Drop arm: negative  Narvaez test: negative  Impingement: negative  Sulcus: absent  Anterosuperior Escape: negative  Lag Sign  0 degrees: negative  Lag Sign 90 degrees: negative  Lift Off Sign: negative  Belly Press: negative  Active Compression Test ([employer]'s Sign): negative  Yergason's Test: negative  Speed's Test: negative  Anterior Drawer Test: 1+   Posterior Drawer Test: 1+  Relocation 90 degrees: negative  Relocation > 90 degrees: negative  Bear Hug: negative  Moving Valgus: negative  Jerk Test: negative    Other   Sensation: normal    Left Shoulder Exam     Inspection/Observation   Swelling: absent  Bruising: absent  Scars: absent  Deformity: absent  Scapular Winging: absent  Scapular Dyskinesia: negative  Atrophy: absent    Tenderness   The patient is experiencing no tenderness.     Range of Motion   Active abduction:  90 normal   Passive abduction:  100 normal   Extension:  0 normal   Forward Flexion:  180 normal   Forward Elevation: 180 normal  Adduction: 40 normal  External Rotation 0 degrees:  40 normal   External Rotation 90 degrees: 90 normal  Internal rotation 0 degrees:  L3 normal   Internal rotation 90 degrees:  0 normal     Tests & Signs   Apprehension: negative  Cross arm: negative  Drop arm: negative  Narvaez test: negative  Impingement: negative  Sulcus: absent  Anterosuperior Escape: negative  Lag Sign 0 degrees: negative  Lag Sign 90 degrees: negative  Lift Off Sign: negative  Belly Press: negative  Active Compression Test ([employer]'s Sign): positive  Yergasons's Test: negative  Speed's Test: negative  Anterior Drawer Test: 1+  Posterior Drawer Test: 1+  Relocation 90 degrees: negative  Relocation > 90 degrees: negative  Bear Hug: negative  Moving Valgus: negative  Jerk Test: negative    Other   Sensation: normal       Muscle Strength   Right Upper Extremity   Shoulder Abduction: 5/5   Shoulder Internal Rotation: 5/5   Shoulder External Rotation: 5/5   Supraspinatus: 5/5/5   Subscapularis: 5/5/5   Biceps: 5/5/5   Left Upper Extremity  Shoulder Abduction: 5/5   Shoulder Internal Rotation: 5/5   Shoulder External  Rotation: 4/5   Supraspinatus: 5/5/5   Subscapularis: 5/5/5   Biceps: 5/5/5     Reflexes     Left Side  Biceps:  2+  Triceps:  2+  Brachioradialis:  2+    Right Side   Biceps:  2+  Triceps:  2+  Brachioradialis:  2+    Vascular Exam     Right Pulses      Radial:                    2+      Left Pulses      Radial:                    2+      Capillary Refill  Right Hand: normal capillary refill  Left Hand: normal capillary refill              Assessment:       Encounter Diagnoses   Name Primary?    Internal derangement of left shoulder Yes    Acute pain of left shoulder     Superior glenoid labrum lesion of left shoulder, initial encounter           Plan:         1. ASES and SF-12 was filled out today in clinic.     RTC in 2 weeks with Dr. Debra Duffy for MRI review. Patient will not fill out ASES, SF-12 on return.    2. Mobic Rx    3. We reviewed with Magan today, the pathology and natural history of his diagnosis. We have discussed a variety of treatment options including medications, physical therapy and other alternative treatments. I also explained the indications, risks and benefits of surgery. After discussion, Magan decided to proceed with surgery. The decision was made to go forward with   Pending MRI Results  Left Shoulder:  1. Arthroscopic SLAP repair  2. Biceps tenodesis  3. Subacromial decompression  4. Possible labral debridement    The details of the surgical procedure were explained, including the location of probable incisions and a description of likely hardware and/or grafts to be used.  The patient understands the likely convalescence after surgery.  Also, we have thoroughly discussed the risks, benefits and alternatives to surgery, including, but not limited to, the risk of infection, joint stiffness, blood clot (including DVT and/or pulmonary embolus), neurologic and vascular injury.  It was explained that, if tissue has been repaired or reconstructed, there is a chance of failure, which may  require further management.    4. PT at [facility] with Alfredo    All of the patient's questions were answered and informed consent was obtained. The patient will contact us if they have any questions or concerns in the interim.                  [facility] patient questionnaires have been collected today.

## 2020-03-18 NOTE — TELEPHONE ENCOUNTER
Spoke to the patient regarding his surgery date which is scheduled for 3/26. We have received a notice from the  Department of Health that we are not allowed to complete elective surgeries at this time. Explained to the patient that the first available surgery date will be 4/21/2020. However, we will call the patient back to reschedule once we have a new surgery date.

## 2020-04-02 DIAGNOSIS — S43.432A SUPERIOR GLENOID LABRUM LESION OF LEFT SHOULDER, INITIAL ENCOUNTER: Primary | ICD-10-CM

## 2020-04-02 DIAGNOSIS — M75.22 BICEPS TENDINITIS OF LEFT UPPER EXTREMITY: ICD-10-CM

## 2020-04-02 DIAGNOSIS — M75.42 IMPINGEMENT SYNDROME OF LEFT SHOULDER: ICD-10-CM

## 2020-04-08 ENCOUNTER — TELEPHONE (OUTPATIENT)
Dept: SPORTS MEDICINE | Facility: CLINIC | Age: 45
End: 2020-04-08

## 2020-04-08 DIAGNOSIS — Z01.818 PRE-OP TESTING: Primary | ICD-10-CM

## 2020-04-08 NOTE — TELEPHONE ENCOUNTER
Spoke to patient about potential upcoming surgery dates. I originally offered the patient 5/5/20 as a potential date but he was concerned about not being able to participate in his son's birthday on the 6th. I offered him 5/7/20 instead and the patient agreed. I informed the patient that we will reach out to him if anything changes and that we will also be reaching out to schedule COVID19 testing prior to his surgery.

## 2020-04-23 ENCOUNTER — TELEPHONE (OUTPATIENT)
Dept: SPORTS MEDICINE | Facility: CLINIC | Age: 45
End: 2020-04-23

## 2020-04-23 DIAGNOSIS — Z01.818 PRE-OP TESTING: Primary | ICD-10-CM

## 2020-04-24 ENCOUNTER — TELEPHONE (OUTPATIENT)
Dept: SPORTS MEDICINE | Facility: CLINIC | Age: 45
End: 2020-04-24

## 2020-05-04 ENCOUNTER — TELEPHONE (OUTPATIENT)
Dept: SPORTS MEDICINE | Facility: CLINIC | Age: 45
End: 2020-05-04

## 2020-05-04 DIAGNOSIS — S43.432A SUPERIOR GLENOID LABRUM LESION OF LEFT SHOULDER, INITIAL ENCOUNTER: Primary | ICD-10-CM

## 2020-05-04 DIAGNOSIS — M75.42 IMPINGEMENT SYNDROME OF LEFT SHOULDER: ICD-10-CM

## 2020-05-04 DIAGNOSIS — M24.812 INTERNAL DERANGEMENT OF LEFT SHOULDER: ICD-10-CM

## 2020-05-04 RX ORDER — ROPIVACAINE/EPI/CLONIDINE/KET 2.46-0.005
SYRINGE (ML) INJECTION ONCE
Status: CANCELLED | OUTPATIENT
Start: 2020-05-04 | End: 2020-05-04

## 2020-05-04 RX ORDER — SODIUM CHLORIDE 0.9 % (FLUSH) 0.9 %
10 SYRINGE (ML) INJECTION
Status: CANCELLED | OUTPATIENT
Start: 2020-05-04

## 2020-05-04 RX ORDER — MUPIROCIN 20 MG/G
OINTMENT TOPICAL
Status: CANCELLED | OUTPATIENT
Start: 2020-05-04

## 2020-05-04 NOTE — TELEPHONE ENCOUNTER
----- Message from Ramo Mauricio sent at 2/2/2017  8:14 AM CST -----  Contact: pt's mom Jory  Pt's son is not feeling any better, ready for referral   Call Back#[phone]  Thanks     Attempted to reach patient to speak to him about pre-op this morning and to see if he made a decision about proceeding with surgery. Was not able to reach patient or leave VM.

## 2020-05-21 ENCOUNTER — TELEPHONE (OUTPATIENT)
Dept: SPORTS MEDICINE | Facility: CLINIC | Age: 45
End: 2020-05-21
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
## 2020-05-22 ENCOUNTER — HOSPITAL ENCOUNTER (EMERGENCY)
Facility: HOSPITAL | Age: 45
Discharge: HOME | End: 2020-05-22
Attending: EMERGENCY MEDICINE
Payer: MEDICAID

## 2020-05-22 VITALS
SYSTOLIC BLOOD PRESSURE: 140 MMHG | TEMPERATURE: 98 F | OXYGEN SATURATION: 99 % | HEART RATE: 69 BPM | DIASTOLIC BLOOD PRESSURE: 81 MMHG | RESPIRATION RATE: 18 BRPM

## 2020-05-22 DIAGNOSIS — S76.011A MUSCLE STRAIN OF RIGHT GLUTEAL REGION, INITIAL ENCOUNTER: Primary | ICD-10-CM

## 2020-05-22 PROCEDURE — 25000003 PHARM REV CODE 250: Performed by: STUDENT IN AN ORGANIZED HEALTH CARE EDUCATION/TRAINING PROGRAM

## 2020-05-22 PROCEDURE — 99284 PR EMERGENCY DEPT VISIT,LEVEL IV: ICD-10-PCS | Mod: ,,, | Performed by: EMERGENCY MEDICINE

## 2020-05-22 PROCEDURE — 99284 EMERGENCY DEPT VISIT MOD MDM: CPT | Mod: ,,, | Performed by: EMERGENCY MEDICINE

## 2020-05-22 PROCEDURE — 99283 EMERGENCY DEPT VISIT LOW MDM: CPT

## 2020-05-22 RX ORDER — KETOROLAC TROMETHAMINE 10 MG/1
10 TABLET, FILM COATED ORAL
Status: COMPLETED | OUTPATIENT
Start: 2020-05-22 | End: 2020-05-22

## 2020-05-22 RX ORDER — NAPROXEN 500 MG/1
500 TABLET ORAL 2 TIMES DAILY PRN
Qty: 10 TABLET | Refills: 0 | Status: SHIPPED | OUTPATIENT
Start: 2020-05-22 | End: 2020-05-27

## 2020-05-22 RX ADMIN — KETOROLAC TROMETHAMINE 10 MG: 10 TABLET, FILM COATED ORAL at 11:05

## 2020-05-23 NOTE — ED PROVIDER NOTES
"Encounter Date: 5/22/2020       History     Chief Complaint   Patient presents with    Leg Pain     patient c/o left buttock and leg pain that started today; described as "sharp & shooting"     HPI     Mr. Magan Douglass is a 46 y/o M with a PMH of HTN who has presented for right sided buttock pain. Patient reports 1 day of pain in his right buttock area. He reports he was lying on the floor yesterday, with his phone in his back pocket, and when he tried to get up from the floor, he felt severe sharp pain in his right gluteal area. The pain has not subsided. Pain is worsened with movement of his right leg and worsened with walking or standing. Patient reports he exercises regularly (including squats and things like burpees) but does not think this has caused his current pain. He has not tried to take anything for the pain. The pain does not radiate anywhere (including down his leg). He has had no numbness or tingling in his leg. He denies any urinary problems such as incontinence, dysuria, frequency, or urgency. He denies any bowel incontinence or constipation. All other review of systems negative.    Review of patient's allergies indicates:  No Known Allergies  Past Medical History:   Diagnosis Date    Asthma     Hypertension      History reviewed. No pertinent surgical history.  Family History   Problem Relation Age of Onset    Heart disease Mother     Asthma Sister     Asthma Brother      Social History     Tobacco Use    Smoking status: Never Smoker    Smokeless tobacco: Never Used   Substance Use Topics    Alcohol use: Not Currently     Comment: about 5 times a month, none today    Drug use: Not Currently     Types: Marijuana     Comment: MJ 5x per day     Review of Systems   Constitutional: Negative for activity change, appetite change, diaphoresis, fatigue and fever.   HENT: Negative for congestion, rhinorrhea, sneezing and sore throat.    Eyes: Negative for photophobia, redness and visual disturbance. "   Respiratory: Negative for cough, chest tightness, shortness of breath and wheezing.    Cardiovascular: Negative for chest pain and palpitations.   Gastrointestinal: Negative for abdominal pain, diarrhea, nausea and vomiting.   Genitourinary: Negative for difficulty urinating, dysuria, frequency and urgency.   Musculoskeletal: Negative for arthralgias, back pain and myalgias.        Right sided buttock pain   Skin: Negative for color change, pallor and rash.   Neurological: Negative for facial asymmetry, speech difficulty, weakness, light-headedness, numbness and headaches.   Hematological: Does not bruise/bleed easily.   Psychiatric/Behavioral: Negative for agitation, behavioral problems and confusion.   All other systems reviewed and are negative.      Physical Exam     Initial Vitals [05/22/20 2246]   BP Pulse Resp Temp SpO2   (!) 140/81 69 18 97.8 °F (36.6 °C) 99 %      MAP       --         Physical Exam    Nursing note and vitals reviewed.  Constitutional: He appears well-developed and well-nourished. He is not diaphoretic. No distress.   45 year old male, not in any acute distress. Alert and oriented.   HENT:   Head: Normocephalic and atraumatic.   Right Ear: External ear normal.   Left Ear: External ear normal.   Nose: Nose normal.   Mouth/Throat: Oropharynx is clear and moist. No oropharyngeal exudate.   Eyes: Conjunctivae and EOM are normal. Pupils are equal, round, and reactive to light. No scleral icterus.   Neck: Normal range of motion. Neck supple. No tracheal deviation present.   Cardiovascular: Normal rate, regular rhythm, normal heart sounds and intact distal pulses.   No murmur heard.  Pulmonary/Chest: Breath sounds normal. No stridor. No respiratory distress. He has no wheezes. He exhibits no tenderness.   Abdominal: Soft. Bowel sounds are normal. He exhibits no distension. There is no tenderness. There is no rebound and no guarding.   Musculoskeletal: He exhibits tenderness. He exhibits no edema.    Tender to pressure of right gluteal area.  Range of motion in right leg limited due to pain of right gluteal area.   Pain in gluteal area with standing / walking.   Neurological: He is alert and oriented to person, place, and time. He has normal strength and normal reflexes. He displays normal reflexes. No cranial nerve deficit or sensory deficit. GCS score is 15. GCS eye subscore is 4. GCS verbal subscore is 5. GCS motor subscore is 6.   Skin: Skin is warm and dry. Capillary refill takes less than 2 seconds. No erythema. No pallor.   Psychiatric: He has a normal mood and affect. His behavior is normal. Thought content normal.         ED Course   Procedures  Labs Reviewed - No data to display       Imaging Results    None          Medical Decision Making:   Initial Assessment:   Patient is a 44 y/o M with a PMH of HTN who has presented for right sided buttock pain. Pain started 1 day ago after lying down on ground with phone in back pocket. Patient tried to get up and felt immediate pain in right gluteal area. No radiation, no numbness or tingling, no genitourinary complaints. Has not taken any medication to relieve pain. Pain worsened with movement of right leg / walking / standing. Patient exercises regularly including squats.   Afebrile and vitals stable. Alert and oriented. Patient with pain to palpation of right gluteal area. Patient reports pain with movement of right leg, standing, and walking. No sensory deficits.   Differential Diagnosis:   DDx include but not limited to: muscle strain, piriformis syndrome, sciatica, disc compression, infection  ED Management:  Patient received oral toradol in ED (10mg).   Patient provided with prescription for naproxen and discharged. Advised to stretch. Strict return precautions discussed.              Attending Attestation:   Physician Attestation Statement for Resident:  As the supervising MD   Physician Attestation Statement: I have personally seen and examined this  patient.   I agree with the above history. -:   As the supervising MD I agree with the above PE.    As the supervising MD I agree with the above treatment, course, plan, and disposition.            I have reviewed and concur with the resident's history, physical, assessment, and plan.  I have personally interviewed and examined the patient at bedside.  See below addendum for my evaluation and additional findings. I did supervise any and all procedures and was present for any critical portion, and was always immediately available for help and as a resource.     Briefly,  this is a 45 y.o.male with no previous medical history presents with pain along the right gluteal muscle.  Exam consistent with piriformis syndrome, muscle strain without any radiculopathy.  No midline spinal tenderness, negative straight leg raise, no bony step-offs.  Suspect likely musculoskeletal, with instructions for exercise, stretching and anti-inflammatory therapy. Patient agreeable to discharge plan. Strict ED precautions and return instructions discussed at length and patient verbalized understanding. All questions were answered and ample time was given for questions.        Complexity:  Moderate high    Final diagnoses:  [S76.011A] Muscle strain of right gluteal region, initial encounter (Primary)       Raymond Smith DO, SYLVIE    Emergency Medicine Physician  Dept of Emergency Medicine   [facility]  Spectralink: [id number]                      Clinical Impression:       ICD-10-CM ICD-9-CM   1. Muscle strain of right gluteal region, initial encounter S76.011A 843.8         Disposition:   Disposition: Discharged  Condition: Stable     ED Disposition Condition    Discharge Stable        ED Prescriptions     Medication Sig Dispense Start Date End Date Auth. Provider    naproxen (NAPROSYN) 500 MG tablet Take 1 tablet (500 mg total) by mouth 2 (two) times daily as needed. 10 tablet 5/22/2020 5/27/2020 Raymond Smith,  DO        Follow-up Information     Follow up With Specialties Details Why Contact Info    [facility]  Schedule an appointment as soon as possible for a visit   [address] [facility] [address] [address]  [phone]      [facility]  Schedule an appointment as soon as possible for a visit   [address]  [facility] [address]  [phone]                                       Ela Tinoco MD  Resident  05/22/20 8926       Raymond Smith DO  05/23/20 7845

## 2020-05-23 NOTE — DISCHARGE INSTRUCTIONS
Your Exam is consistent with a muscle strain of your right gluteal region.  Please do stretching exercises we discussed.  You may take naproxen twice a day for 5 days with food as needed.  You may also supplement this with Tylenol if needed.  I have given you instructions on self-care for strains and sprains.  Please read this and follow-up with your primary care if you have any worsening symptoms.  If you do not have a primary care, follow-up with the phone number above.    Our goal in the emergency department is to always give you outstanding care and exceptional service. You may receive a survey by mail or e-mail in the next week regarding your experience in our ED. We would greatly appreciate your completing and returning the survey. Your feedback provides us with a way to recognize our staff who give very good care and it helps us learn how to improve when your experience was below our aspiration of excellence.

## 2020-05-27 ENCOUNTER — PATIENT OUTREACH (OUTPATIENT)
Dept: EMERGENCY MEDICINE | Facility: HOSPITAL | Age: 45
End: 2020-05-27

## 2021-03-05 ENCOUNTER — TELEPHONE (OUTPATIENT)
Dept: INTERNAL MEDICINE | Facility: CLINIC | Age: 46
End: 2021-03-05

## 2021-03-25 ENCOUNTER — TELEPHONE (OUTPATIENT)
Dept: INTERNAL MEDICINE | Facility: CLINIC | Age: 46
End: 2021-03-25

## 2021-03-26 ENCOUNTER — OFFICE VISIT (OUTPATIENT)
Dept: INTERNAL MEDICINE | Facility: CLINIC | Age: 46
End: 2021-03-26
Payer: MEDICAID

## 2021-03-26 ENCOUNTER — LAB VISIT (OUTPATIENT)
Dept: LAB | Facility: HOSPITAL | Age: 46
End: 2021-03-26
Payer: MEDICAID

## 2021-03-26 VITALS
HEART RATE: 82 BPM | OXYGEN SATURATION: 98 % | BODY MASS INDEX: 30.97 KG/M2 | WEIGHT: 192.69 LBS | SYSTOLIC BLOOD PRESSURE: 145 MMHG | DIASTOLIC BLOOD PRESSURE: 90 MMHG | HEIGHT: 66 IN

## 2021-03-26 DIAGNOSIS — Z00.00 ANNUAL PHYSICAL EXAM: Primary | ICD-10-CM

## 2021-03-26 DIAGNOSIS — I10 HYPERTENSION, UNSPECIFIED TYPE: ICD-10-CM

## 2021-03-26 DIAGNOSIS — Z00.00 ANNUAL PHYSICAL EXAM: ICD-10-CM

## 2021-03-26 LAB
ALBUMIN SERPL BCP-MCNC: 3.9 G/DL (ref 3.5–5.2)
ALP SERPL-CCNC: 76 U/L (ref 55–135)
ALT SERPL W/O P-5'-P-CCNC: 35 U/L (ref 10–44)
ANION GAP SERPL CALC-SCNC: 9 MMOL/L (ref 8–16)
AST SERPL-CCNC: 23 U/L (ref 10–40)
BILIRUB SERPL-MCNC: 0.9 MG/DL (ref 0.1–1)
BUN SERPL-MCNC: 11 MG/DL (ref 6–20)
CALCIUM SERPL-MCNC: 9.4 MG/DL (ref 8.7–10.5)
CHLORIDE SERPL-SCNC: 103 MMOL/L (ref 95–110)
CHOLEST SERPL-MCNC: 222 MG/DL (ref 120–199)
CHOLEST/HDLC SERPL: 4.4 {RATIO} (ref 2–5)
CO2 SERPL-SCNC: 28 MMOL/L (ref 23–29)
CREAT SERPL-MCNC: 1 MG/DL (ref 0.5–1.4)
EST. GFR  (AFRICAN AMERICAN): >60 ML/MIN/1.73 M^2
EST. GFR  (NON AFRICAN AMERICAN): >60 ML/MIN/1.73 M^2
ESTIMATED AVG GLUCOSE: 105 MG/DL (ref 68–131)
GLUCOSE SERPL-MCNC: 93 MG/DL (ref 70–110)
HBA1C MFR BLD: 5.3 % (ref 4–5.6)
HDLC SERPL-MCNC: 51 MG/DL (ref 40–75)
HDLC SERPL: 23 % (ref 20–50)
LDLC SERPL CALC-MCNC: 145 MG/DL (ref 63–159)
NONHDLC SERPL-MCNC: 171 MG/DL
POTASSIUM SERPL-SCNC: 4.5 MMOL/L (ref 3.5–5.1)
PROT SERPL-MCNC: 8.7 G/DL (ref 6–8.4)
SODIUM SERPL-SCNC: 140 MMOL/L (ref 136–145)
TRIGL SERPL-MCNC: 130 MG/DL (ref 30–150)

## 2021-03-26 PROCEDURE — 36415 COLL VENOUS BLD VENIPUNCTURE: CPT | Performed by: INTERNAL MEDICINE

## 2021-03-26 PROCEDURE — 99213 OFFICE O/P EST LOW 20 MIN: CPT | Mod: PBBFAC | Performed by: INTERNAL MEDICINE

## 2021-03-26 PROCEDURE — 80053 COMPREHEN METABOLIC PANEL: CPT | Performed by: INTERNAL MEDICINE

## 2021-03-26 PROCEDURE — 99396 PR PREVENTIVE VISIT,EST,40-64: ICD-10-PCS | Mod: S$PBB,,, | Performed by: INTERNAL MEDICINE

## 2021-03-26 PROCEDURE — 99999 PR PBB SHADOW E&M-EST. PATIENT-LVL III: CPT | Mod: PBBFAC,,, | Performed by: INTERNAL MEDICINE

## 2021-03-26 PROCEDURE — 86592 SYPHILIS TEST NON-TREP QUAL: CPT | Performed by: INTERNAL MEDICINE

## 2021-03-26 PROCEDURE — 80061 LIPID PANEL: CPT | Performed by: INTERNAL MEDICINE

## 2021-03-26 PROCEDURE — 86703 HIV-1/HIV-2 1 RESULT ANTBDY: CPT | Performed by: INTERNAL MEDICINE

## 2021-03-26 PROCEDURE — 99999 PR PBB SHADOW E&M-EST. PATIENT-LVL III: ICD-10-PCS | Mod: PBBFAC,,, | Performed by: INTERNAL MEDICINE

## 2021-03-26 PROCEDURE — 99396 PREV VISIT EST AGE 40-64: CPT | Mod: S$PBB,,, | Performed by: INTERNAL MEDICINE

## 2021-03-26 PROCEDURE — 83036 HEMOGLOBIN GLYCOSYLATED A1C: CPT | Performed by: INTERNAL MEDICINE

## 2021-03-26 PROCEDURE — 87340 HEPATITIS B SURFACE AG IA: CPT | Performed by: INTERNAL MEDICINE

## 2021-03-26 RX ORDER — AMLODIPINE BESYLATE 5 MG/1
5 TABLET ORAL DAILY
Qty: 30 TABLET | Refills: 11 | Status: SHIPPED | OUTPATIENT
Start: 2021-03-26 | End: 2022-04-14 | Stop reason: SDUPTHER

## 2021-03-27 LAB — RPR SER QL: NORMAL

## 2021-03-29 LAB
HBV SURFACE AG SERPL QL IA: NEGATIVE
HIV 1+2 AB+HIV1 P24 AG SERPL QL IA: NEGATIVE

## 2021-03-31 ENCOUNTER — TELEPHONE (OUTPATIENT)
Dept: HEPATOLOGY | Facility: HOSPITAL | Age: 46
End: 2021-03-31

## 2021-03-31 DIAGNOSIS — R77.8 ELEVATED TOTAL PROTEIN: Primary | ICD-10-CM

## 2021-07-19 ENCOUNTER — HOSPITAL ENCOUNTER (EMERGENCY)
Facility: HOSPITAL | Age: 46
Discharge: HOME | End: 2021-07-19
Attending: EMERGENCY MEDICINE
Payer: MEDICAID

## 2021-07-19 VITALS
DIASTOLIC BLOOD PRESSURE: 116 MMHG | SYSTOLIC BLOOD PRESSURE: 166 MMHG | BODY MASS INDEX: 29.51 KG/M2 | RESPIRATION RATE: 20 BRPM | OXYGEN SATURATION: 98 % | TEMPERATURE: 98 F | HEART RATE: 84 BPM | HEIGHT: 67 IN | WEIGHT: 188 LBS

## 2021-07-19 DIAGNOSIS — J06.9 VIRAL URI: Primary | ICD-10-CM

## 2021-07-19 LAB
CTP QC/QA: YES
SARS-COV-2 RDRP RESP QL NAA+PROBE: NEGATIVE

## 2021-07-19 PROCEDURE — U0002 COVID-19 LAB TEST NON-CDC: HCPCS | Performed by: EMERGENCY MEDICINE

## 2021-07-19 PROCEDURE — 99284 EMERGENCY DEPT VISIT MOD MDM: CPT | Mod: CS,,, | Performed by: EMERGENCY MEDICINE

## 2021-07-19 PROCEDURE — 99284 PR EMERGENCY DEPT VISIT,LEVEL IV: ICD-10-PCS | Mod: CS,,, | Performed by: EMERGENCY MEDICINE

## 2021-07-19 PROCEDURE — 99282 EMERGENCY DEPT VISIT SF MDM: CPT | Mod: 25

## 2021-08-19 ENCOUNTER — NURSE TRIAGE (OUTPATIENT)
Dept: ADMINISTRATIVE | Facility: CLINIC | Age: 46
End: 2021-08-19

## 2021-08-19 ENCOUNTER — TELEPHONE (OUTPATIENT)
Dept: INTERNAL MEDICINE | Facility: CLINIC | Age: 46
End: 2021-08-19

## 2021-08-20 ENCOUNTER — TELEPHONE (OUTPATIENT)
Dept: INTERNAL MEDICINE | Facility: CLINIC | Age: 46
End: 2021-08-20

## 2021-08-27 ENCOUNTER — IMMUNIZATION (OUTPATIENT)
Dept: PRIMARY CARE CLINIC | Facility: CLINIC | Age: 46
End: 2021-08-27
Payer: MEDICAID

## 2021-08-27 DIAGNOSIS — Z23 NEED FOR VACCINATION: Primary | ICD-10-CM

## 2021-08-27 PROCEDURE — 0001A COVID-19, MRNA, LNP-S, PF, 30 MCG/0.3 ML DOSE VACCINE: CPT | Mod: CV19,S$GLB,, | Performed by: INTERNAL MEDICINE

## 2021-08-27 PROCEDURE — 91300 COVID-19, MRNA, LNP-S, PF, 30 MCG/0.3 ML DOSE VACCINE: ICD-10-PCS | Mod: S$GLB,,, | Performed by: INTERNAL MEDICINE

## 2021-08-27 PROCEDURE — 91300 COVID-19, MRNA, LNP-S, PF, 30 MCG/0.3 ML DOSE VACCINE: CPT | Mod: S$GLB,,, | Performed by: INTERNAL MEDICINE

## 2021-08-27 PROCEDURE — 0001A COVID-19, MRNA, LNP-S, PF, 30 MCG/0.3 ML DOSE VACCINE: ICD-10-PCS | Mod: CV19,S$GLB,, | Performed by: INTERNAL MEDICINE

## 2021-09-17 ENCOUNTER — IMMUNIZATION (OUTPATIENT)
Dept: PRIMARY CARE CLINIC | Facility: CLINIC | Age: 46
End: 2021-09-17

## 2021-09-17 DIAGNOSIS — Z23 NEED FOR VACCINATION: Primary | ICD-10-CM

## 2021-09-17 PROCEDURE — 91300 COVID-19, MRNA, LNP-S, PF, 30 MCG/0.3 ML DOSE VACCINE: ICD-10-PCS | Mod: S$GLB,,, | Performed by: INTERNAL MEDICINE

## 2021-09-17 PROCEDURE — 0002A COVID-19, MRNA, LNP-S, PF, 30 MCG/0.3 ML DOSE VACCINE: CPT | Mod: CV19,S$GLB,, | Performed by: INTERNAL MEDICINE

## 2021-09-17 PROCEDURE — 91300 COVID-19, MRNA, LNP-S, PF, 30 MCG/0.3 ML DOSE VACCINE: CPT | Mod: S$GLB,,, | Performed by: INTERNAL MEDICINE

## 2021-09-17 PROCEDURE — 0002A COVID-19, MRNA, LNP-S, PF, 30 MCG/0.3 ML DOSE VACCINE: ICD-10-PCS | Mod: CV19,S$GLB,, | Performed by: INTERNAL MEDICINE

## 2022-04-13 ENCOUNTER — TELEPHONE (OUTPATIENT)
Dept: INTERNAL MEDICINE | Facility: CLINIC | Age: 47
End: 2022-04-13
Payer: MEDICAID

## 2022-04-13 NOTE — TELEPHONE ENCOUNTER
----- Message from Jannette Leong sent at 4/13/2022 11:30 AM CDT -----  Contact: [phone]  Pt states he is calling to speak to someone in regards to his blood pressure he states he just left an office and his pressure is up high and he states he has not had blood pressure medication in like 15 days and he does not like the way the blood pressure medication was making him feel he is also asking for an appt as well please advise and give return call

## 2022-04-13 NOTE — TELEPHONE ENCOUNTER
Was seen in [facility] today   bp was increased   Will go to er as suggested   He has not taken his mediatation  Explained if symptoms change   He should go to er  Will come in tomorrow

## 2022-04-14 RX ORDER — AMLODIPINE BESYLATE 5 MG/1
5 TABLET ORAL DAILY
Qty: 30 TABLET | Refills: 11 | Status: CANCELLED | OUTPATIENT
Start: 2022-04-14 | End: 2023-01-01

## 2022-04-14 RX ORDER — AMLODIPINE BESYLATE 5 MG/1
5 TABLET ORAL DAILY
Qty: 30 TABLET | Refills: 1 | Status: SHIPPED | OUTPATIENT
Start: 2022-04-14 | End: 2022-07-15 | Stop reason: SDUPTHER

## 2022-06-01 ENCOUNTER — TELEPHONE (OUTPATIENT)
Dept: INTERNAL MEDICINE | Facility: CLINIC | Age: 47
End: 2022-06-01
Payer: MEDICAID

## 2022-06-01 NOTE — TELEPHONE ENCOUNTER
Spoke w pt and notified him of appt cancellation (binta) and rescheduled on an earlier date w/ another provider.

## 2022-06-02 ENCOUNTER — OFFICE VISIT (OUTPATIENT)
Dept: INTERNAL MEDICINE | Facility: CLINIC | Age: 47
End: 2022-06-02
Payer: MEDICAID

## 2022-06-02 VITALS
SYSTOLIC BLOOD PRESSURE: 130 MMHG | BODY MASS INDEX: 29.9 KG/M2 | WEIGHT: 190.5 LBS | DIASTOLIC BLOOD PRESSURE: 90 MMHG | HEART RATE: 87 BPM | OXYGEN SATURATION: 98 % | HEIGHT: 67 IN

## 2022-06-02 DIAGNOSIS — I10 PRIMARY HYPERTENSION: Primary | ICD-10-CM

## 2022-06-02 PROCEDURE — 99213 PR OFFICE/OUTPT VISIT, EST, LEVL III, 20-29 MIN: ICD-10-PCS | Mod: S$PBB,,, | Performed by: STUDENT IN AN ORGANIZED HEALTH CARE EDUCATION/TRAINING PROGRAM

## 2022-06-02 PROCEDURE — 99214 OFFICE O/P EST MOD 30 MIN: CPT | Mod: PBBFAC | Performed by: STUDENT IN AN ORGANIZED HEALTH CARE EDUCATION/TRAINING PROGRAM

## 2022-06-02 PROCEDURE — 99999 PR PBB SHADOW E&M-EST. PATIENT-LVL IV: ICD-10-PCS | Mod: PBBFAC,,, | Performed by: STUDENT IN AN ORGANIZED HEALTH CARE EDUCATION/TRAINING PROGRAM

## 2022-06-02 PROCEDURE — 99213 OFFICE O/P EST LOW 20 MIN: CPT | Mod: S$PBB,,, | Performed by: STUDENT IN AN ORGANIZED HEALTH CARE EDUCATION/TRAINING PROGRAM

## 2022-06-02 PROCEDURE — 99999 PR PBB SHADOW E&M-EST. PATIENT-LVL IV: CPT | Mod: PBBFAC,,, | Performed by: STUDENT IN AN ORGANIZED HEALTH CARE EDUCATION/TRAINING PROGRAM

## 2022-06-02 NOTE — PATIENT INSTRUCTIONS
Continue Amlodipine 5 mg daily   Exercise   Diet changes   Call clinic in July to establish care with new provider and follow up blood pressure.

## 2022-06-02 NOTE — PROGRESS NOTES
Clinic Note  6/2/2022      Subjective:       Patient ID:  Magan is a 47 y.o. male being seen for an established visit.    Chief Complaint: Blood Pressure Check    HPI  Mr. Magan Douglass is a 46 y.o. male w/ PMhx of asthma and HTN who present to clinic for blood pressure check.   He was diagnosed couple of years ago, no consistent with with medication or lifestyle changes. He lost 2 of his friends due to cardiovascular accident. He's buryng his friend tomorrow. He decided to make changes in his life. Took amlodipine consistent for 2 weeks, started to exercise for past 6 day and motivated to continue for his 16 years old son. He has no other complains, review of symptoms negative. His PCP graduated. Advised to establish care with new provider for annual and close BP monitor. All questions and concerned addressed.       Review of Systems   Constitutional: Negative for chills, fever and weight loss.   HENT: Negative for sore throat.    Respiratory: Negative for shortness of breath.    Cardiovascular: Negative for chest pain.   Gastrointestinal: Negative for abdominal pain and vomiting.   Genitourinary: Negative for hematuria.   Neurological: Negative for weakness.   Psychiatric/Behavioral: Negative for depression.       Past Medical History:   Diagnosis Date    Asthma     Hypertension        Family History   Problem Relation Age of Onset    Heart disease Mother     Asthma Sister     Asthma Brother         reports that he has never smoked. He has never used smokeless tobacco. He reports previous alcohol use. He reports previous drug use. Drug: Marijuana.    Medication List with Changes/Refills   Current Medications    AMLODIPINE (NORVASC) 5 MG TABLET    Take 1 tablet (5 mg total) by mouth once daily.    AMOXICILLIN (AMOXIL) 500 MG CAPSULE    Take 500 mg by mouth every 6 to 8 hours as needed.    CLOTRIMAZOLE (LOTRIMIN) 1 % CREAM    Apply topically 2 (two) times daily. Apply to affected area twice per day    FAMOTIDINE  "(PEPCID) 20 MG TABLET    Take 1 tablet (20 mg total) by mouth 2 (two) times daily. for 5 days     Review of patient's allergies indicates:  No Known Allergies    Patient Active Problem List   Diagnosis    Internal derangement of left shoulder    Acute pain of left shoulder    Hypertension           Objective:      BP (!) 130/90 (BP Location: Right arm, Patient Position: Sitting, BP Method: Large (Manual))   Pulse 87   Ht 5' 6.5" (1.689 m)   Wt 86.4 kg (190 lb 7.6 oz)   SpO2 98%   BMI 30.28 kg/m²   Estimated body mass index is 30.28 kg/m² as calculated from the following:    Height as of this encounter: 5' 6.5" (1.689 m).    Weight as of this encounter: 86.4 kg (190 lb 7.6 oz).  Physical Exam  Constitutional:       Appearance: Normal appearance.   HENT:      Head: Normocephalic and atraumatic.      Nose: Nose normal.   Eyes:      Pupils: Pupils are equal, round, and reactive to light.   Cardiovascular:      Rate and Rhythm: Normal rate.   Pulmonary:      Effort: Pulmonary effort is normal.   Abdominal:      General: Bowel sounds are normal. There is no distension.   Musculoskeletal:      Right lower leg: No edema.      Left lower leg: No edema.   Neurological:      General: No focal deficit present.      Mental Status: He is alert and oriented to person, place, and time.   Psychiatric:         Mood and Affect: Mood normal.         Behavior: Behavior normal.           Assessment and Plan:         Magan was seen today for blood pressure check.    Diagnoses and all orders for this visit:    Primary hypertension    BP improving this visit. Encouraged to continue taking amlodipine, exercise and watch diet. Discussed in details. All questions and concerned addressed.   He will but BP cuff and start checking at home, provide with log.     Follow up in about 4 weeks (around 6/30/2022) for Establish care with new provider.      Shira White MD  Internal Medicine - PGYIII  [facility]  [address] [place] [address] " [place], [place] [id number] [phone]

## 2022-07-15 ENCOUNTER — LAB VISIT (OUTPATIENT)
Dept: LAB | Facility: HOSPITAL | Age: 47
End: 2022-07-15
Payer: MEDICAID

## 2022-07-15 ENCOUNTER — OFFICE VISIT (OUTPATIENT)
Dept: INTERNAL MEDICINE | Facility: CLINIC | Age: 47
End: 2022-07-15
Payer: MEDICAID

## 2022-07-15 VITALS — HEART RATE: 65 BPM | SYSTOLIC BLOOD PRESSURE: 142 MMHG | DIASTOLIC BLOOD PRESSURE: 80 MMHG

## 2022-07-15 DIAGNOSIS — I10 PRIMARY HYPERTENSION: Primary | ICD-10-CM

## 2022-07-15 DIAGNOSIS — Z00.00 PREVENTATIVE HEALTH CARE: ICD-10-CM

## 2022-07-15 DIAGNOSIS — Z11.3 SCREENING FOR STD (SEXUALLY TRANSMITTED DISEASE): ICD-10-CM

## 2022-07-15 LAB
ALBUMIN SERPL BCP-MCNC: 4.2 G/DL (ref 3.5–5.2)
ALP SERPL-CCNC: 78 U/L (ref 55–135)
ALT SERPL W/O P-5'-P-CCNC: 40 U/L (ref 10–44)
ANION GAP SERPL CALC-SCNC: 10 MMOL/L (ref 8–16)
AST SERPL-CCNC: 23 U/L (ref 10–40)
BASOPHILS # BLD AUTO: 0.02 K/UL (ref 0–0.2)
BASOPHILS NFR BLD: 0.3 % (ref 0–1.9)
BILIRUB SERPL-MCNC: 1.1 MG/DL (ref 0.1–1)
BUN SERPL-MCNC: 15 MG/DL (ref 6–20)
CALCIUM SERPL-MCNC: 10.5 MG/DL (ref 8.7–10.5)
CHLORIDE SERPL-SCNC: 103 MMOL/L (ref 95–110)
CHOLEST SERPL-MCNC: 220 MG/DL (ref 120–199)
CHOLEST/HDLC SERPL: 4.8 {RATIO} (ref 2–5)
CO2 SERPL-SCNC: 28 MMOL/L (ref 23–29)
CREAT SERPL-MCNC: 1.1 MG/DL (ref 0.5–1.4)
DIFFERENTIAL METHOD: ABNORMAL
EOSINOPHIL # BLD AUTO: 0.3 K/UL (ref 0–0.5)
EOSINOPHIL NFR BLD: 4.3 % (ref 0–8)
ERYTHROCYTE [DISTWIDTH] IN BLOOD BY AUTOMATED COUNT: 13.2 % (ref 11.5–14.5)
EST. GFR  (AFRICAN AMERICAN): >60 ML/MIN/1.73 M^2
EST. GFR  (NON AFRICAN AMERICAN): >60 ML/MIN/1.73 M^2
ESTIMATED AVG GLUCOSE: 108 MG/DL (ref 68–131)
GLUCOSE SERPL-MCNC: 94 MG/DL (ref 70–110)
HBA1C MFR BLD: 5.4 % (ref 4–5.6)
HCT VFR BLD AUTO: 48.5 % (ref 40–54)
HDLC SERPL-MCNC: 46 MG/DL (ref 40–75)
HDLC SERPL: 20.9 % (ref 20–50)
HGB BLD-MCNC: 16 G/DL (ref 14–18)
IMM GRANULOCYTES # BLD AUTO: 0.01 K/UL (ref 0–0.04)
IMM GRANULOCYTES NFR BLD AUTO: 0.2 % (ref 0–0.5)
LDLC SERPL CALC-MCNC: 144.8 MG/DL (ref 63–159)
LYMPHOCYTES # BLD AUTO: 2.3 K/UL (ref 1–4.8)
LYMPHOCYTES NFR BLD: 39.9 % (ref 18–48)
MCH RBC QN AUTO: 31.5 PG (ref 27–31)
MCHC RBC AUTO-ENTMCNC: 33 G/DL (ref 32–36)
MCV RBC AUTO: 96 FL (ref 82–98)
MONOCYTES # BLD AUTO: 0.5 K/UL (ref 0.3–1)
MONOCYTES NFR BLD: 8.6 % (ref 4–15)
NEUTROPHILS # BLD AUTO: 2.7 K/UL (ref 1.8–7.7)
NEUTROPHILS NFR BLD: 46.7 % (ref 38–73)
NONHDLC SERPL-MCNC: 174 MG/DL
NRBC BLD-RTO: 0 /100 WBC
PLATELET # BLD AUTO: 327 K/UL (ref 150–450)
PMV BLD AUTO: 10.1 FL (ref 9.2–12.9)
POTASSIUM SERPL-SCNC: 4.5 MMOL/L (ref 3.5–5.1)
PROT SERPL-MCNC: 8.4 G/DL (ref 6–8.4)
RBC # BLD AUTO: 5.08 M/UL (ref 4.6–6.2)
SODIUM SERPL-SCNC: 141 MMOL/L (ref 136–145)
TRIGL SERPL-MCNC: 146 MG/DL (ref 30–150)
WBC # BLD AUTO: 5.81 K/UL (ref 3.9–12.7)

## 2022-07-15 PROCEDURE — 3077F PR MOST RECENT SYSTOLIC BLOOD PRESSURE >= 140 MM HG: ICD-10-PCS | Mod: CPTII,,, | Performed by: STUDENT IN AN ORGANIZED HEALTH CARE EDUCATION/TRAINING PROGRAM

## 2022-07-15 PROCEDURE — 3079F DIAST BP 80-89 MM HG: CPT | Mod: CPTII,,, | Performed by: STUDENT IN AN ORGANIZED HEALTH CARE EDUCATION/TRAINING PROGRAM

## 2022-07-15 PROCEDURE — 99999 PR PBB SHADOW E&M-EST. PATIENT-LVL II: ICD-10-PCS | Mod: PBBFAC,,, | Performed by: STUDENT IN AN ORGANIZED HEALTH CARE EDUCATION/TRAINING PROGRAM

## 2022-07-15 PROCEDURE — 80053 COMPREHEN METABOLIC PANEL: CPT | Performed by: STUDENT IN AN ORGANIZED HEALTH CARE EDUCATION/TRAINING PROGRAM

## 2022-07-15 PROCEDURE — 3044F PR MOST RECENT HEMOGLOBIN A1C LEVEL <7.0%: ICD-10-PCS | Mod: CPTII,,, | Performed by: STUDENT IN AN ORGANIZED HEALTH CARE EDUCATION/TRAINING PROGRAM

## 2022-07-15 PROCEDURE — 86803 HEPATITIS C AB TEST: CPT | Performed by: STUDENT IN AN ORGANIZED HEALTH CARE EDUCATION/TRAINING PROGRAM

## 2022-07-15 PROCEDURE — 85025 COMPLETE CBC W/AUTO DIFF WBC: CPT | Performed by: STUDENT IN AN ORGANIZED HEALTH CARE EDUCATION/TRAINING PROGRAM

## 2022-07-15 PROCEDURE — 99213 OFFICE O/P EST LOW 20 MIN: CPT | Mod: S$PBB,,, | Performed by: STUDENT IN AN ORGANIZED HEALTH CARE EDUCATION/TRAINING PROGRAM

## 2022-07-15 PROCEDURE — 3077F SYST BP >= 140 MM HG: CPT | Mod: CPTII,,, | Performed by: STUDENT IN AN ORGANIZED HEALTH CARE EDUCATION/TRAINING PROGRAM

## 2022-07-15 PROCEDURE — 80061 LIPID PANEL: CPT | Performed by: STUDENT IN AN ORGANIZED HEALTH CARE EDUCATION/TRAINING PROGRAM

## 2022-07-15 PROCEDURE — 3044F HG A1C LEVEL LT 7.0%: CPT | Mod: CPTII,,, | Performed by: STUDENT IN AN ORGANIZED HEALTH CARE EDUCATION/TRAINING PROGRAM

## 2022-07-15 PROCEDURE — 99213 PR OFFICE/OUTPT VISIT, EST, LEVL III, 20-29 MIN: ICD-10-PCS | Mod: S$PBB,,, | Performed by: STUDENT IN AN ORGANIZED HEALTH CARE EDUCATION/TRAINING PROGRAM

## 2022-07-15 PROCEDURE — 87389 HIV-1 AG W/HIV-1&-2 AB AG IA: CPT | Performed by: STUDENT IN AN ORGANIZED HEALTH CARE EDUCATION/TRAINING PROGRAM

## 2022-07-15 PROCEDURE — 36415 COLL VENOUS BLD VENIPUNCTURE: CPT | Performed by: STUDENT IN AN ORGANIZED HEALTH CARE EDUCATION/TRAINING PROGRAM

## 2022-07-15 PROCEDURE — 3079F PR MOST RECENT DIASTOLIC BLOOD PRESSURE 80-89 MM HG: ICD-10-PCS | Mod: CPTII,,, | Performed by: STUDENT IN AN ORGANIZED HEALTH CARE EDUCATION/TRAINING PROGRAM

## 2022-07-15 PROCEDURE — 99999 PR PBB SHADOW E&M-EST. PATIENT-LVL II: CPT | Mod: PBBFAC,,, | Performed by: STUDENT IN AN ORGANIZED HEALTH CARE EDUCATION/TRAINING PROGRAM

## 2022-07-15 PROCEDURE — 99212 OFFICE O/P EST SF 10 MIN: CPT | Mod: PBBFAC | Performed by: STUDENT IN AN ORGANIZED HEALTH CARE EDUCATION/TRAINING PROGRAM

## 2022-07-15 PROCEDURE — 83036 HEMOGLOBIN GLYCOSYLATED A1C: CPT | Performed by: STUDENT IN AN ORGANIZED HEALTH CARE EDUCATION/TRAINING PROGRAM

## 2022-07-15 RX ORDER — AMLODIPINE BESYLATE 5 MG/1
5 TABLET ORAL DAILY
Qty: 30 TABLET | Refills: 1 | Status: SHIPPED | OUTPATIENT
Start: 2022-07-15 | End: 2022-08-24 | Stop reason: SDUPTHER

## 2022-07-15 NOTE — PROGRESS NOTES
47 year old male is here to establish care.     HTN    Measures blood pressure at home. It has been 120s/80s. Once a day, after taking medication. Has been taking seasoning with salt in the food but has been trying to control it.     Working out 3 days/week.      ASCVD  The 10-year ASCVD risk score (Alfonzo KEITH Jr., et al., 2013) is: 8.4%    Values used to calculate the score:      Age: 47 years      Sex: Male      Is Non- : Yes      Diabetic: No      Tobacco smoker: No      Systolic Blood Pressure: 130 mmHg      Is BP treated: Yes      HDL Cholesterol: 51 mg/dL      Total Cholesterol: 222 mg/dL    Wants to recheck lipid and try diet and exercise.     Change in sexual partner  Sexually active with female. Changed partner 4 months. Uses condom. Usually oral sex. Before that he had a prior partner. Denies urethral discharge, ulcer, fever, myalgia.     Preventative  Due for covid booster and colonoscopy. Opts to do FIT test.        Past Medical History:   Diagnosis Date    Asthma     Hypertension      No past surgical history on file.  Review of patient's allergies indicates:  No Known Allergies    Current Outpatient Medications on File Prior to Visit   Medication Sig Dispense Refill    amoxicillin (AMOXIL) 500 MG capsule Take 500 mg by mouth every 6 to 8 hours as needed.      clotrimazole (LOTRIMIN) 1 % cream Apply topically 2 (two) times daily. Apply to affected area twice per day (Patient not taking: No sig reported) 14 g 0    famotidine (PEPCID) 20 MG tablet Take 1 tablet (20 mg total) by mouth 2 (two) times daily. for 5 days 10 tablet 0    [DISCONTINUED] amLODIPine (NORVASC) 5 MG tablet Take 1 tablet (5 mg total) by mouth once daily. 30 tablet 1     No current facility-administered medications on file prior to visit.     Social History     Socioeconomic History    Marital status: Single   Tobacco Use    Smoking status: Never Smoker    Smokeless tobacco: Never Used   Substance and  Sexual Activity    Alcohol use: Not Currently     Comment: about 5 times a month, none today    Drug use: Not Currently     Types: Marijuana     Comment: MJ 2x per day    Sexual activity: Yes     Partners: Female     Birth control/protection: Condom     Comment: 2 partners.     Review of Systems   Constitutional: Negative for fever, malaise/fatigue and weight loss.   HENT: Negative for congestion and hearing loss.    Eyes: Negative for blurred vision and redness.   Respiratory: Negative for cough and shortness of breath.    Cardiovascular: Negative for chest pain, palpitations and leg swelling.   Gastrointestinal: Negative for abdominal pain, diarrhea, heartburn, nausea and vomiting.   Genitourinary: Negative for dysuria, flank pain and hematuria.   Musculoskeletal: Negative for back pain, joint pain, myalgias and neck pain.   Skin: Negative for rash.   Neurological: Negative for dizziness, tingling, tremors, sensory change, focal weakness, seizures, loss of consciousness, weakness and headaches.   Endo/Heme/Allergies: Does not bruise/bleed easily.   Psychiatric/Behavioral: Negative for depression, hallucinations, substance abuse and suicidal ideas. The patient is not nervous/anxious and does not have insomnia.        Vitals:    07/15/22 1047   BP: (!) 142/80   Pulse:      Physical Exam  HENT:      Head: Normocephalic and atraumatic.   Eyes:      Conjunctiva/sclera: Conjunctivae normal.      Pupils: Pupils are equal, round, and reactive to light.   Cardiovascular:      Rate and Rhythm: Normal rate and regular rhythm.   Pulmonary:      Effort: Pulmonary effort is normal.      Breath sounds: Normal breath sounds.   Abdominal:      General: Bowel sounds are normal.      Palpations: Abdomen is soft.   Musculoskeletal:         General: Normal range of motion.      Cervical back: Normal range of motion and neck supple.   Skin:     General: Skin is warm and dry.   Neurological:      Mental Status: He is alert and  oriented to person, place, and time.      Gait: Gait is intact.   Psychiatric:         Mood and Affect: Mood and affect normal.         Cognition and Memory: Memory normal.         Judgment: Judgment normal.       A/P       Primary hypertension  -     amLODIPine (NORVASC) 5 MG tablet; Take 1 tablet (5 mg total) by mouth once daily.  Dispense: 30 tablet; Refill: 1  -     Continue checking bp at home.   -     Low salt diet handout provided.     Preventative health care    -     CBC Auto Differential; Future; Expected date: 07/15/2022  -     Comprehensive Metabolic Panel; Future; Expected date: 07/15/2022  -     Hemoglobin A1C; Future; Expected date: 07/15/2022  -     Lipid Panel; Future; Expected date: 07/15/2022  -     Fecal Immunochemical Test (iFOBT); Future; Expected date: 07/15/2022    Screening for STD  Pt had a recent change in partner. Denies urethral discharge, ulcer, fever, myalgia. Will screen for HIV and Hep c. Previously was negative on 3/2021.     -     HIV 1/2 Ag/Ab (4th Gen); Future; Expected date: 07/15/2022  -     Hepatitis C Antibody; Future; Expected date: 07/15/2022        F/U in 3 months.

## 2022-07-18 LAB
HCV AB SERPL QL IA: NEGATIVE
HIV 1+2 AB+HIV1 P24 AG SERPL QL IA: NEGATIVE

## 2022-07-19 ENCOUNTER — TELEPHONE (OUTPATIENT)
Dept: HEPATOLOGY | Facility: HOSPITAL | Age: 47
End: 2022-07-19
Payer: MEDICAID

## 2022-07-19 NOTE — TELEPHONE ENCOUNTER
Results discussed. Pt's ASCVD     The 10-year ASCVD risk score (Alfonzo KEITH Jr., et al., 2013) is: 10.1%    Values used to calculate the score:      Age: 47 years      Sex: Male      Is Non- : Yes      Diabetic: No      Tobacco smoker: No      Systolic Blood Pressure: 142 mmHg      Is BP treated: Yes      HDL Cholesterol: 46 mg/dL      Total Cholesterol: 220 mg/dL    Discussed about statin option given ASCVD > 7.5%. Pt wants to continue trying lifestyle modification prior to starting meds. Appt set up on 10/17.

## 2022-08-03 ENCOUNTER — LAB VISIT (OUTPATIENT)
Dept: LAB | Facility: HOSPITAL | Age: 47
End: 2022-08-03
Payer: MEDICAID

## 2022-08-03 DIAGNOSIS — Z00.00 PREVENTATIVE HEALTH CARE: ICD-10-CM

## 2022-08-03 PROCEDURE — 82274 ASSAY TEST FOR BLOOD FECAL: CPT | Performed by: STUDENT IN AN ORGANIZED HEALTH CARE EDUCATION/TRAINING PROGRAM

## 2022-08-09 LAB — HEMOCCULT STL QL IA: NEGATIVE

## 2022-08-15 ENCOUNTER — HOSPITAL ENCOUNTER (EMERGENCY)
Facility: HOSPITAL | Age: 47
Discharge: HOME | End: 2022-08-15
Attending: EMERGENCY MEDICINE
Payer: MEDICAID

## 2022-08-15 VITALS
DIASTOLIC BLOOD PRESSURE: 61 MMHG | TEMPERATURE: 99 F | HEART RATE: 75 BPM | RESPIRATION RATE: 18 BRPM | SYSTOLIC BLOOD PRESSURE: 122 MMHG | WEIGHT: 185 LBS | BODY MASS INDEX: 29.03 KG/M2 | OXYGEN SATURATION: 98 % | HEIGHT: 67 IN

## 2022-08-15 DIAGNOSIS — M25.561 RIGHT KNEE PAIN: ICD-10-CM

## 2022-08-15 DIAGNOSIS — S83.91XA SPRAIN OF RIGHT KNEE, UNSPECIFIED LIGAMENT, INITIAL ENCOUNTER: Primary | ICD-10-CM

## 2022-08-15 PROCEDURE — 99284 EMERGENCY DEPT VISIT MOD MDM: CPT | Mod: ,,, | Performed by: EMERGENCY MEDICINE

## 2022-08-15 PROCEDURE — 99284 PR EMERGENCY DEPT VISIT,LEVEL IV: ICD-10-PCS | Mod: ,,, | Performed by: EMERGENCY MEDICINE

## 2022-08-15 PROCEDURE — 99284 EMERGENCY DEPT VISIT MOD MDM: CPT | Mod: 25

## 2022-08-15 PROCEDURE — 25000003 PHARM REV CODE 250: Performed by: EMERGENCY MEDICINE

## 2022-08-15 RX ORDER — KETOROLAC TROMETHAMINE 10 MG/1
10 TABLET, FILM COATED ORAL
Status: COMPLETED | OUTPATIENT
Start: 2022-08-15 | End: 2022-08-15

## 2022-08-15 RX ORDER — METHOCARBAMOL 500 MG/1
500 TABLET, FILM COATED ORAL 3 TIMES DAILY PRN
Qty: 15 TABLET | Refills: 0 | Status: SHIPPED | OUTPATIENT
Start: 2022-08-15 | End: 2022-08-20

## 2022-08-15 RX ORDER — NAPROXEN 375 MG/1
375 TABLET ORAL 2 TIMES DAILY WITH MEALS
Qty: 10 TABLET | Refills: 0 | Status: SHIPPED | OUTPATIENT
Start: 2022-08-15 | End: 2022-08-20

## 2022-08-15 RX ADMIN — KETOROLAC TROMETHAMINE 10 MG: 10 TABLET, FILM COATED ORAL at 03:08

## 2022-08-15 NOTE — DISCHARGE INSTRUCTIONS
Today, your found to have a knee sprain with a small fluid collection behind her kneecap.  This is likely from a muscle injury.  You will need stretching exercises and rest.  You may also follow-up with physical therapy which has been referred for you.  You may use anti-inflammatory and muscle relaxant.    Our goal in the emergency department is to always give you outstanding care and exceptional service. You may receive a survey by mail or e-mail in the next week regarding your experience in our ED. We would greatly appreciate your completing and returning the survey. Your feedback provides us with a way to recognize our staff who give very good care and it helps us learn how to improve when your experience was below our aspiration of excellence.

## 2022-08-15 NOTE — ED PROVIDER NOTES
Encounter Date: 8/15/2022       History     Chief Complaint   Patient presents with    Knee Injury     R knee pain for 9d     HPI   Magan Douglass is a 47-year-old male with a history of asthma and hypertension presenting with right knee pain.  Patient states that he has been noticing a limp to his right lower extremity originating from the knee that occurred from an injury while doing cardio exercises 9 days ago.  He has noticed it progressively since then.  He denies any fevers, chills, inability to bend, open fractures, swelling, redness or warmth.  He denies any other traumatic injuries.  He denies any falls, numbness, paresthesias, tingling, weakness, lightheadedness, dizziness, abdominal pain or any other symptoms.  The pain is mild to moderate in severity, worse with ambulation.  He has not tried any over-the-counter treatment or therapies.    Review of patient's allergies indicates:  No Known Allergies  Past Medical History:   Diagnosis Date    Asthma     Hypertension      No past surgical history on file.  Family History   Problem Relation Age of Onset    Heart disease Mother     Asthma Sister     Asthma Brother      Social History     Tobacco Use    Smoking status: Never Smoker    Smokeless tobacco: Never Used   Substance Use Topics    Alcohol use: Not Currently     Comment: about 5 times a month, none today    Drug use: Not Currently     Types: Marijuana     Comment: MJ 2x per day     Review of Systems   Constitutional: Negative for chills and fever.   HENT: Negative for sore throat.    Eyes: Negative for photophobia and visual disturbance.   Respiratory: Negative for chest tightness and shortness of breath.    Cardiovascular: Negative for chest pain, palpitations and leg swelling.   Gastrointestinal: Negative for abdominal pain, nausea and vomiting.   Genitourinary: Negative for dysuria and frequency.   Musculoskeletal: Positive for gait problem and myalgias.   Skin: Negative for color change  and wound.   Neurological: Negative for dizziness, numbness and headaches.   Psychiatric/Behavioral: Negative for confusion. The patient is not nervous/anxious.        Physical Exam     Initial Vitals [08/15/22 1350]   BP Pulse Resp Temp SpO2   122/61 75 18 98.6 °F (37 °C) 98 %      MAP       --         Physical Exam    Nursing note and vitals reviewed.      Gen/Constitutional: Interactive. No acute distress  Head: Normocephalic, Atraumatic  Neck: supple, no masses or LAD, no JVD  Eyes: PERRLA, conjunctiva clear  Ears, Nose and Throat: No rhinorrhea or stridor.  Cardiac:  Regular rate, Reg Rhythm, No murmur  Pulmonary: CTA Bilat, no wheezes, rhonchi, rales.  No increased work of breathing.  GI: Abdomen soft, non-tender, non-distended; no rebound or guarding  : No CVA tenderness.  Musculoskeletal: Extremities warm, well perfused, no erythema, no edema  Right Knee: normal inspection (no redness, swelling). No warmth.  ROM full. Ligament exam normal - no laxity w lachman, varus or valgus stress or posterior drawer testing. No tenderness over medial or lateral joint line  No pain w full flexion and No click w Wolf  Skin: No rashes, cyanosis or jaundice.  Neuro: Alert and Oriented x 3; No focal motor or sensory deficits.    Psych: Normal affect      ED Course   Procedures  Labs Reviewed - No data to display       Imaging Results          X-Ray Knee 3 View Right (Final result)  Result time 08/15/22 16:13:37    Final result by Uday Turner MD (08/15/22 16:13:37)                 Impression:      Suspected small nonspecific suprapatellar joint effusion, without acute displaced fracture-dislocation identified.      Electronically signed by: Uday Turner MD  Date:    08/15/2022  Time:    16:13             Narrative:    EXAMINATION:  XR KNEE 3 VIEW RIGHT    CLINICAL HISTORY:  Pain in right knee    TECHNIQUE:  AP, lateral, and Merchant views of the right knee were performed.    COMPARISON:  None    FINDINGS:  Bones are  well mineralized. Overall alignment is within normal limits. No displaced fracture, dislocation or destructive osseous process.  Suspected small nonspecific suprapatellar joint effusion.  Joint spaces otherwise appear relatively maintained.  Prominent enthesophyte at the superior patellar pole.  No subcutaneous emphysema or radiodense retained foreign body.                              X-Rays:   Independently Interpreted Readings:   Other Readings:  Right knee x-ray:  Small suprapatellar joint effusion without fracture or dislocation    Medications   ketorolac tablet 10 mg (10 mg Oral Given 8/15/22 4286)     Medical Decision Making:   History:   Old Medical Records: I decided to obtain old medical records.  Initial Assessment:   Magan Douglass is a 47-year-old male with a history of asthma and hypertension presenting with right knee pain.   Differential Diagnosis:   Contusion, sprain, strain, fracture, dislocation, Baker cyst, effusion, septic arthritis  Independently Interpreted Test(s):   I have ordered and independently interpreted X-rays - see prior notes.  Clinical Tests:   Radiological Study: Ordered and Reviewed    Urgent evaluation of patient presenting with 2 week history of right knee pain after cardio exercise training.  He is afebrile vital signs stable.  Physical exam findings remarkable for no bony deformity, no open fracture, able to bear weight, and negative ligamentous exam is.  He is able to bear weight, ambulate and has full range of motion.  Do not suspect septic arthritis or Baker cyst.  I am unable to palpate any large effusions on my exam.  There is no warmth or open fracture.  He did not have any traumatic injury other than exercise training.  Suspect likely ligamentous strain with possible effusion.  X-ray of the right knee was obtained which shows a small suprapatellar joint effusion without fracture or dislocation on my read.  This was verified by Radiology.  Will plan for Ace wrap,  elevation, rest and ice as needed.  Anti-inflammatories and muscle relaxants prescribed.  Outpatient physical therapy recommended.  Patient verbalized understanding. Patient agreeable to discharge plan. Strict ED precautions and return instructions discussed at length and patient verbalized understanding. All questions were answered and ample time was given for questions.      Complexity:  Moderate high                    Clinical Impression:   Final diagnoses:  [M25.561] Right knee pain  [S83.91XA] Sprain of right knee, unspecified ligament, initial encounter (Primary)          ED Disposition Condition    Discharge Stable        ED Prescriptions     Medication Sig Dispense Start Date End Date Auth. Provider    naproxen (NAPROSYN) 375 MG tablet Take 1 tablet (375 mg total) by mouth 2 (two) times daily with meals. for 5 days 10 tablet 8/15/2022 8/20/2022 Raymond Smith DO    methocarbamoL (ROBAXIN) 500 MG Tab Take 1 tablet (500 mg total) by mouth 3 (three) times daily as needed (pain). 15 tablet 8/15/2022 8/20/2022 Raymond Smith DO        Follow-up Information     Follow up With Specialties Details Why Contact Info    Felicia Wetzel MD Internal Medicine   [address] [facility] [id number] [phone]           Raymond Smith DO, [facility]  Emergency Staff Physician   Dept of Emergency Medicine   [facility]  Spectralink: [id number]        Disclaimer: This note has been generated using voice-recognition software. There may be typographical errors that have been missed during proof-reading.       Raymond Smith DO  08/16/22 1042

## 2022-08-24 RX ORDER — AMLODIPINE BESYLATE 5 MG/1
5 TABLET ORAL DAILY
Qty: 90 TABLET | Refills: 3 | Status: SHIPPED | OUTPATIENT
Start: 2022-08-24 | End: 2022-08-29 | Stop reason: SDUPTHER

## 2022-08-29 ENCOUNTER — PATIENT MESSAGE (OUTPATIENT)
Dept: INTERNAL MEDICINE | Facility: CLINIC | Age: 47
End: 2022-08-29

## 2022-08-29 ENCOUNTER — OFFICE VISIT (OUTPATIENT)
Dept: INTERNAL MEDICINE | Facility: CLINIC | Age: 47
End: 2022-08-29
Payer: MEDICAID

## 2022-08-29 ENCOUNTER — LAB VISIT (OUTPATIENT)
Dept: LAB | Facility: HOSPITAL | Age: 47
End: 2022-08-29
Payer: MEDICAID

## 2022-08-29 VITALS
HEIGHT: 66 IN | WEIGHT: 189.38 LBS | OXYGEN SATURATION: 99 % | DIASTOLIC BLOOD PRESSURE: 98 MMHG | HEART RATE: 76 BPM | BODY MASS INDEX: 30.44 KG/M2 | SYSTOLIC BLOOD PRESSURE: 148 MMHG

## 2022-08-29 DIAGNOSIS — I10 PRIMARY HYPERTENSION: ICD-10-CM

## 2022-08-29 DIAGNOSIS — Z11.3 ROUTINE SCREENING FOR STI (SEXUALLY TRANSMITTED INFECTION): ICD-10-CM

## 2022-08-29 DIAGNOSIS — Z72.51 UNPROTECTED SEXUAL INTERCOURSE: ICD-10-CM

## 2022-08-29 DIAGNOSIS — Z72.51 UNPROTECTED SEXUAL INTERCOURSE: Primary | ICD-10-CM

## 2022-08-29 DIAGNOSIS — M25.561 RIGHT KNEE PAIN, UNSPECIFIED CHRONICITY: ICD-10-CM

## 2022-08-29 LAB — HIV 1+2 AB+HIV1 P24 AG SERPL QL IA: NORMAL

## 2022-08-29 PROCEDURE — 86592 SYPHILIS TEST NON-TREP QUAL: CPT

## 2022-08-29 PROCEDURE — 3077F PR MOST RECENT SYSTOLIC BLOOD PRESSURE >= 140 MM HG: ICD-10-PCS | Mod: CPTII,,,

## 2022-08-29 PROCEDURE — 3080F DIAST BP >= 90 MM HG: CPT | Mod: CPTII,,,

## 2022-08-29 PROCEDURE — 99213 OFFICE O/P EST LOW 20 MIN: CPT | Mod: PBBFAC

## 2022-08-29 PROCEDURE — 99999 PR PBB SHADOW E&M-EST. PATIENT-LVL III: ICD-10-PCS | Mod: PBBFAC,,,

## 2022-08-29 PROCEDURE — 3008F PR BODY MASS INDEX (BMI) DOCUMENTED: ICD-10-PCS | Mod: CPTII,,,

## 2022-08-29 PROCEDURE — 3044F HG A1C LEVEL LT 7.0%: CPT | Mod: CPTII,,,

## 2022-08-29 PROCEDURE — 1160F RVW MEDS BY RX/DR IN RCRD: CPT | Mod: CPTII,,,

## 2022-08-29 PROCEDURE — 1160F PR REVIEW ALL MEDS BY PRESCRIBER/CLIN PHARMACIST DOCUMENTED: ICD-10-PCS | Mod: CPTII,,,

## 2022-08-29 PROCEDURE — 87591 N.GONORRHOEAE DNA AMP PROB: CPT

## 2022-08-29 PROCEDURE — 99213 PR OFFICE/OUTPT VISIT, EST, LEVL III, 20-29 MIN: ICD-10-PCS | Mod: S$PBB,,,

## 2022-08-29 PROCEDURE — 87389 HIV-1 AG W/HIV-1&-2 AB AG IA: CPT

## 2022-08-29 PROCEDURE — 1159F MED LIST DOCD IN RCRD: CPT | Mod: CPTII,,,

## 2022-08-29 PROCEDURE — 1159F PR MEDICATION LIST DOCUMENTED IN MEDICAL RECORD: ICD-10-PCS | Mod: CPTII,,,

## 2022-08-29 PROCEDURE — 3044F PR MOST RECENT HEMOGLOBIN A1C LEVEL <7.0%: ICD-10-PCS | Mod: CPTII,,,

## 2022-08-29 PROCEDURE — 36415 COLL VENOUS BLD VENIPUNCTURE: CPT

## 2022-08-29 PROCEDURE — 87491 CHLMYD TRACH DNA AMP PROBE: CPT

## 2022-08-29 PROCEDURE — 3077F SYST BP >= 140 MM HG: CPT | Mod: CPTII,,,

## 2022-08-29 PROCEDURE — 3080F PR MOST RECENT DIASTOLIC BLOOD PRESSURE >= 90 MM HG: ICD-10-PCS | Mod: CPTII,,,

## 2022-08-29 PROCEDURE — 3008F BODY MASS INDEX DOCD: CPT | Mod: CPTII,,,

## 2022-08-29 PROCEDURE — 99999 PR PBB SHADOW E&M-EST. PATIENT-LVL III: CPT | Mod: PBBFAC,,,

## 2022-08-29 PROCEDURE — 99213 OFFICE O/P EST LOW 20 MIN: CPT | Mod: S$PBB,,,

## 2022-08-29 RX ORDER — AMLODIPINE BESYLATE 5 MG/1
5 TABLET ORAL DAILY
Qty: 90 TABLET | Refills: 3 | Status: SHIPPED | OUTPATIENT
Start: 2022-08-29 | End: 2023-01-01

## 2022-08-29 RX ORDER — DICLOFENAC SODIUM 10 MG/G
2 GEL TOPICAL 4 TIMES DAILY
Qty: 4 G | Refills: 3 | Status: CANCELLED | OUTPATIENT
Start: 2022-08-29

## 2022-08-29 NOTE — Clinical Note
I recommended defined follow-up of approximately 1 month to recheck blood pressure and confirm effectiveness of amlodipine.  Thank you

## 2022-08-29 NOTE — ASSESSMENT & PLAN NOTE
Patient out of amlodipine for past 3 days. Called his [facility], they claim it was filled on 8/12 and his insurance will not cover another refill until 9/7.     - Called patient, left a message advising him to call his insurance to allow for sooner refill  - sent in one year supply amlodipine 5

## 2022-08-29 NOTE — PROGRESS NOTES
"Clinic Note  8/29/2022      Subjective:       Patient ID:  Magan is a 47 y.o. male being seen for an established care visit.    Chief Complaint: Follow-up    HPI   48 yo M with hx of asthma, HTN presents for ED follow up for right knee pain. He had been noticing a limp in RLE originating from knee that occurred after in injury doing cardio exercises on 8/6. Seen in the ED on 8/15, discharged with naproxen and methocarbamol. Suspected likely ligamentous injury with effusion. Improved since his ED visit. Getting back to working out. Trying to lose 18 lbs before October, aiming to get down to 165-170 lbs. Only took naproxen for 3 days. Can walk and exercise, no longer needing knee brace.     Xray knee (8/15)   "Bones are well mineralized. Overall alignment is within normal limits. No displaced fracture, dislocation or destructive osseous process.  Suspected small nonspecific suprapatellar joint effusion.  Joint spaces otherwise appear relatively maintained.  Prominent enthesophyte at the superior patellar pole.  No subcutaneous emphysema or radiodense retained foreign body.  Impression:  Suspected small nonspecific suprapatellar joint effusion, without acute displaced fracture-dislocation identified."    Concern for STI  - penile discharge: none  - dysuria: none  - hematuria: none  - Denies rash, itching, lesion, Ulcer   - vaginal/penile/oral: vaginal and oral (received).  Uses disposable sex toy for performing oral sex, no contact with vagina  - unprotected sex 10 days ago. Vaginal penetration without condom.   - how many partners: one female currently  - known STI history of partner - unknown, she claims she is STI free, not formally getting tested   - hx of STI: chlamydia, treated with antibiotics at age 18. Had some penile discharge, no burning.     HTN  - has been out of amlodipine for past 3 days  - elevated BP in clinic  - refilled by Dr. Wetzel on 8/24, but pharmacy claiming it's not ready.       Review of Systems " "  HENT:  Positive for congestion.    Eyes: Negative.    Respiratory:  Negative for cough and shortness of breath.    Cardiovascular:  Negative for leg swelling.   Genitourinary:  Negative for dysuria, hematuria and urgency.   Musculoskeletal:  Negative for joint pain and myalgias.     Medication List with Changes/Refills   Current Medications    AMOXICILLIN (AMOXIL) 500 MG CAPSULE    Take 500 mg by mouth every 6 to 8 hours as needed.    CLOTRIMAZOLE (LOTRIMIN) 1 % CREAM    Apply topically 2 (two) times daily. Apply to affected area twice per day    FAMOTIDINE (PEPCID) 20 MG TABLET    Take 1 tablet (20 mg total) by mouth 2 (two) times daily. for 5 days   Changed and/or Refilled Medications    Modified Medication Previous Medication    AMLODIPINE (NORVASC) 5 MG TABLET amLODIPine (NORVASC) 5 MG tablet       Take 1 tablet (5 mg total) by mouth once daily.    Take 1 tablet (5 mg total) by mouth once daily.       Patient Active Problem List   Diagnosis    Internal derangement of left shoulder    Acute pain of left shoulder    Hypertension    Routine screening for STI (sexually transmitted infection)           Objective:      BP (!) 148/98 (BP Location: Left arm, Patient Position: Sitting, BP Method: Large (Manual))   Pulse 76   Ht 5' 6" (1.676 m)   Wt 85.9 kg (189 lb 6 oz)   SpO2 99%   BMI 30.57 kg/m²   Estimated body mass index is 30.57 kg/m² as calculated from the following:    Height as of this encounter: 5' 6" (1.676 m).    Weight as of this encounter: 85.9 kg (189 lb 6 oz).  Physical Exam  Constitutional:       General: He is not in acute distress.     Appearance: Normal appearance. He is not ill-appearing.   HENT:      Head: Normocephalic and atraumatic.   Eyes:      General: No scleral icterus.  Cardiovascular:      Pulses: Normal pulses.      Heart sounds: Normal heart sounds. No murmur heard.  Pulmonary:      Effort: Pulmonary effort is normal. No respiratory distress.      Breath sounds: Normal breath " sounds. No wheezing or rales.   Abdominal:      General: Abdomen is flat. Bowel sounds are normal. There is no distension.      Tenderness: There is no abdominal tenderness. There is no guarding.   Musculoskeletal:      Right lower leg: No edema.      Left lower leg: No edema.   Skin:     General: Skin is warm.      Coloration: Skin is not jaundiced.   Neurological:      General: No focal deficit present.      Mental Status: He is alert and oriented to person, place, and time.   Psychiatric:         Mood and Affect: Mood normal.       Assessment and Plan:         Problem List Items Addressed This Visit          Cardiac/Vascular    Hypertension    Current Assessment & Plan     Patient out of amlodipine for past 3 days. [facility], they claim it was filled on [facility]/12 and his insurance will not cover another refill until 9/7.     - Called patient, left a message advising him to call his insurance to allow for sooner refill  - sent in one year supply amlodipine 5         Relevant Medications    amLODIPine (NORVASC) 5 MG tablet       ID    Routine screening for STI (sexually transmitted infection)    Current Assessment & Plan     Patient concerned for STI after having unprotected vaginal sexual intercourse 10 days ago. Asymptomatic.     - RPR, HIV sent  - Urine collected for chlamydia/gonorrhea         Relevant Orders    HIV 1/2 Ag/Ab (4th Gen)    RPR     Other Visit Diagnoses       Unprotected sexual intercourse    -  Primary    Relevant Orders    HIV 1/2 Ag/Ab (4th Gen)    RPR    C. trachomatis/N. gonorrhoeae by AMP DNA [facility]; Urine    Right knee pain, unspecified chronicity                Follow Up:   Follow up if symptoms worsen or fail to improve.        Timothy López      Signed:   Timothy López MD - PGY2  Internal Medicine  [facility]

## 2022-08-29 NOTE — ASSESSMENT & PLAN NOTE
Patient concerned for STI after having unprotected vaginal sexual intercourse 10 days ago. Asymptomatic.     - RPR, HIV sent  - Urine collected for chlamydia/gonorrhea

## 2022-08-29 NOTE — PROGRESS NOTES
I have discussed the case with house staff and I have reviewed the appropriate portions of the patient's chart, and the house staff's history and physical, assessment and plan. I agree with the findings, assessment and plan.

## 2022-08-30 ENCOUNTER — PATIENT MESSAGE (OUTPATIENT)
Dept: INTERNAL MEDICINE | Facility: CLINIC | Age: 47
End: 2022-08-30
Payer: MEDICAID

## 2022-08-30 LAB
C TRACH DNA SPEC QL NAA+PROBE: NOT DETECTED
N GONORRHOEA DNA SPEC QL NAA+PROBE: NOT DETECTED
RPR SER QL: NORMAL

## 2022-09-06 NOTE — PROGRESS NOTES
I have reviewed the notes, assessments, and/or procedures performed by Dr. Wetzel, I concur with her/his documentation of Magan Douglass.    HTN - uncontrolled

## 2023-01-01 ENCOUNTER — LAB VISIT (OUTPATIENT)
Dept: LAB | Facility: HOSPITAL | Age: 48
End: 2023-01-01
Payer: MEDICAID

## 2023-01-01 ENCOUNTER — OFFICE VISIT (OUTPATIENT)
Dept: INTERNAL MEDICINE | Facility: CLINIC | Age: 48
End: 2023-01-01
Payer: MEDICAID

## 2023-01-01 ENCOUNTER — PATIENT MESSAGE (OUTPATIENT)
Dept: INTERNAL MEDICINE | Facility: CLINIC | Age: 48
End: 2023-01-01
Payer: MEDICAID

## 2023-01-01 ENCOUNTER — HOSPITAL ENCOUNTER (EMERGENCY)
Facility: HOSPITAL | Age: 48
End: 2023-09-09
Attending: EMERGENCY MEDICINE
Payer: MEDICAID

## 2023-01-01 ENCOUNTER — TELEPHONE (OUTPATIENT)
Dept: INTERNAL MEDICINE | Facility: CLINIC | Age: 48
End: 2023-01-01
Payer: MEDICAID

## 2023-01-01 VITALS
WEIGHT: 201.94 LBS | SYSTOLIC BLOOD PRESSURE: 125 MMHG | DIASTOLIC BLOOD PRESSURE: 85 MMHG | BODY MASS INDEX: 32.59 KG/M2

## 2023-01-01 VITALS — OXYGEN SATURATION: 49 %

## 2023-01-01 DIAGNOSIS — Z00.00 PREVENTATIVE HEALTH CARE: ICD-10-CM

## 2023-01-01 DIAGNOSIS — R21 RASH: ICD-10-CM

## 2023-01-01 DIAGNOSIS — I46.9 CARDIOPULMONARY ARREST: Primary | ICD-10-CM

## 2023-01-01 DIAGNOSIS — Z00.00 ANNUAL PHYSICAL EXAM: ICD-10-CM

## 2023-01-01 DIAGNOSIS — I10 PRIMARY HYPERTENSION: ICD-10-CM

## 2023-01-01 DIAGNOSIS — Z00.00 ANNUAL PHYSICAL EXAM: Primary | ICD-10-CM

## 2023-01-01 LAB
ALBUMIN SERPL BCP-MCNC: 3.8 G/DL (ref 3.5–5.2)
ALP SERPL-CCNC: 68 U/L (ref 55–135)
ALT SERPL W/O P-5'-P-CCNC: 64 U/L (ref 10–44)
ANION GAP SERPL CALC-SCNC: 6 MMOL/L (ref 8–16)
AST SERPL-CCNC: 32 U/L (ref 10–40)
BASOPHILS # BLD AUTO: 0.03 K/UL (ref 0–0.2)
BASOPHILS NFR BLD: 0.5 % (ref 0–1.9)
BILIRUB SERPL-MCNC: 0.7 MG/DL (ref 0.1–1)
BUN SERPL-MCNC: 15 MG/DL (ref 6–20)
CALCIUM SERPL-MCNC: 9.8 MG/DL (ref 8.7–10.5)
CHLORIDE SERPL-SCNC: 103 MMOL/L (ref 95–110)
CO2 SERPL-SCNC: 28 MMOL/L (ref 23–29)
COMPLEXED PSA SERPL-MCNC: 0.79 NG/ML (ref 0–4)
CREAT SERPL-MCNC: 1 MG/DL (ref 0.5–1.4)
DIFFERENTIAL METHOD: NORMAL
EOSINOPHIL # BLD AUTO: 0.3 K/UL (ref 0–0.5)
EOSINOPHIL NFR BLD: 4.9 % (ref 0–8)
ERYTHROCYTE [DISTWIDTH] IN BLOOD BY AUTOMATED COUNT: 13.2 % (ref 11.5–14.5)
EST. GFR  (NO RACE VARIABLE): >60 ML/MIN/1.73 M^2
ESTIMATED AVG GLUCOSE: 108 MG/DL (ref 68–131)
GLUCOSE SERPL-MCNC: 79 MG/DL (ref 70–110)
HBA1C MFR BLD: 5.4 % (ref 4–5.6)
HCT VFR BLD AUTO: 45.6 % (ref 40–54)
HGB BLD-MCNC: 14.9 G/DL (ref 14–18)
IMM GRANULOCYTES # BLD AUTO: 0.01 K/UL (ref 0–0.04)
IMM GRANULOCYTES NFR BLD AUTO: 0.2 % (ref 0–0.5)
LYMPHOCYTES # BLD AUTO: 2.3 K/UL (ref 1–4.8)
LYMPHOCYTES NFR BLD: 42.3 % (ref 18–48)
MCH RBC QN AUTO: 30.8 PG (ref 27–31)
MCHC RBC AUTO-ENTMCNC: 32.7 G/DL (ref 32–36)
MCV RBC AUTO: 94 FL (ref 82–98)
MONOCYTES # BLD AUTO: 0.6 K/UL (ref 0.3–1)
MONOCYTES NFR BLD: 10.2 % (ref 4–15)
NEUTROPHILS # BLD AUTO: 2.3 K/UL (ref 1.8–7.7)
NEUTROPHILS NFR BLD: 41.9 % (ref 38–73)
NRBC BLD-RTO: 0 /100 WBC
PLATELET # BLD AUTO: 282 K/UL (ref 150–450)
PMV BLD AUTO: 9.9 FL (ref 9.2–12.9)
POTASSIUM SERPL-SCNC: 4.5 MMOL/L (ref 3.5–5.1)
PROT SERPL-MCNC: 8.3 G/DL (ref 6–8.4)
RBC # BLD AUTO: 4.84 M/UL (ref 4.6–6.2)
SODIUM SERPL-SCNC: 137 MMOL/L (ref 136–145)
WBC # BLD AUTO: 5.48 K/UL (ref 3.9–12.7)

## 2023-01-01 PROCEDURE — 25000003 PHARM REV CODE 250: Performed by: EMERGENCY MEDICINE

## 2023-01-01 PROCEDURE — 31500 INSERT EMERGENCY AIRWAY: CPT

## 2023-01-01 PROCEDURE — 3044F PR MOST RECENT HEMOGLOBIN A1C LEVEL <7.0%: ICD-10-PCS | Mod: CPTII,,,

## 2023-01-01 PROCEDURE — 92950 HEART/LUNG RESUSCITATION CPR: CPT

## 2023-01-01 PROCEDURE — 1159F PR MEDICATION LIST DOCUMENTED IN MEDICAL RECORD: ICD-10-PCS | Mod: CPTII,,,

## 2023-01-01 PROCEDURE — 3008F BODY MASS INDEX DOCD: CPT | Mod: CPTII,,,

## 2023-01-01 PROCEDURE — 96365 THER/PROPH/DIAG IV INF INIT: CPT

## 2023-01-01 PROCEDURE — 36415 COLL VENOUS BLD VENIPUNCTURE: CPT

## 2023-01-01 PROCEDURE — 1159F MED LIST DOCD IN RCRD: CPT | Mod: CPTII,,,

## 2023-01-01 PROCEDURE — 99999 PR PBB SHADOW E&M-EST. PATIENT-LVL III: ICD-10-PCS | Mod: PBBFAC,,,

## 2023-01-01 PROCEDURE — 80053 COMPREHEN METABOLIC PANEL: CPT

## 2023-01-01 PROCEDURE — 99900035 HC TECH TIME PER 15 MIN (STAT)

## 2023-01-01 PROCEDURE — 96375 TX/PRO/DX INJ NEW DRUG ADDON: CPT

## 2023-01-01 PROCEDURE — 96376 TX/PRO/DX INJ SAME DRUG ADON: CPT

## 2023-01-01 PROCEDURE — 99291 CRITICAL CARE FIRST HOUR: CPT

## 2023-01-01 PROCEDURE — 3079F PR MOST RECENT DIASTOLIC BLOOD PRESSURE 80-89 MM HG: ICD-10-PCS | Mod: CPTII,,,

## 2023-01-01 PROCEDURE — 99213 OFFICE O/P EST LOW 20 MIN: CPT | Mod: S$PBB,,,

## 2023-01-01 PROCEDURE — 3074F PR MOST RECENT SYSTOLIC BLOOD PRESSURE < 130 MM HG: ICD-10-PCS | Mod: CPTII,,,

## 2023-01-01 PROCEDURE — 99999 PR PBB SHADOW E&M-EST. PATIENT-LVL III: CPT | Mod: PBBFAC,,,

## 2023-01-01 PROCEDURE — 3079F DIAST BP 80-89 MM HG: CPT | Mod: CPTII,,,

## 2023-01-01 PROCEDURE — 1160F RVW MEDS BY RX/DR IN RCRD: CPT | Mod: CPTII,,,

## 2023-01-01 PROCEDURE — 99213 OFFICE O/P EST LOW 20 MIN: CPT | Mod: PBBFAC

## 2023-01-01 PROCEDURE — 85025 COMPLETE CBC W/AUTO DIFF WBC: CPT

## 2023-01-01 PROCEDURE — 84153 ASSAY OF PSA TOTAL: CPT

## 2023-01-01 PROCEDURE — 1160F PR REVIEW ALL MEDS BY PRESCRIBER/CLIN PHARMACIST DOCUMENTED: ICD-10-PCS | Mod: CPTII,,,

## 2023-01-01 PROCEDURE — 3044F HG A1C LEVEL LT 7.0%: CPT | Mod: CPTII,,,

## 2023-01-01 PROCEDURE — 3074F SYST BP LT 130 MM HG: CPT | Mod: CPTII,,,

## 2023-01-01 PROCEDURE — 83036 HEMOGLOBIN GLYCOSYLATED A1C: CPT

## 2023-01-01 PROCEDURE — 63600175 PHARM REV CODE 636 W HCPCS: Performed by: EMERGENCY MEDICINE

## 2023-01-01 PROCEDURE — 99213 PR OFFICE/OUTPT VISIT, EST, LEVL III, 20-29 MIN: ICD-10-PCS | Mod: S$PBB,,,

## 2023-01-01 PROCEDURE — 3008F PR BODY MASS INDEX (BMI) DOCUMENTED: ICD-10-PCS | Mod: CPTII,,,

## 2023-01-01 RX ORDER — EPINEPHRINE 0.1 MG/ML
INJECTION INTRAVENOUS CODE/TRAUMA/SEDATION MEDICATION
Status: COMPLETED | OUTPATIENT
Start: 2023-01-01 | End: 2023-01-01

## 2023-01-01 RX ORDER — SODIUM BICARBONATE 1 MEQ/ML
SYRINGE (ML) INTRAVENOUS CODE/TRAUMA/SEDATION MEDICATION
Status: COMPLETED | OUTPATIENT
Start: 2023-01-01 | End: 2023-01-01

## 2023-01-01 RX ORDER — DOXYLAMINE SUCCINATE 25 MG
TABLET ORAL 2 TIMES DAILY
Qty: 28 G | Refills: 0 | Status: SHIPPED | OUTPATIENT
Start: 2023-01-01

## 2023-01-01 RX ADMIN — EPINEPHRINE 1 MG: 0.1 INJECTION INTRAVENOUS at 03:09

## 2023-01-01 RX ADMIN — SODIUM BICARBONATE 50 MEQ: 84 INJECTION INTRAVENOUS at 04:09

## 2023-01-01 RX ADMIN — EPINEPHRINE 1 MG: 0.1 INJECTION INTRAVENOUS at 04:09

## 2023-01-01 RX ADMIN — SODIUM BICARBONATE 50 MEQ: 84 INJECTION INTRAVENOUS at 03:09

## 2023-06-30 NOTE — TELEPHONE ENCOUNTER
Spoke w/ pt and notified him that the provider has graduated and assisted w/ rescheduling pt w/ a new provider. Pt understood.

## 2023-08-29 PROBLEM — M25.512 ACUTE PAIN OF LEFT SHOULDER: Status: RESOLVED | Noted: 2020-03-18 | Resolved: 2023-01-01

## 2023-08-29 PROBLEM — R21 RASH: Status: ACTIVE | Noted: 2023-01-01

## 2023-08-29 PROBLEM — Z11.3 ROUTINE SCREENING FOR STI (SEXUALLY TRANSMITTED INFECTION): Status: RESOLVED | Noted: 2022-08-29 | Resolved: 2023-01-01

## 2023-08-29 NOTE — PROGRESS NOTES
[facility]  CLINIC NOTE    Patient Name: Magan Douglass  YOB: [birth year]    Subjective:      Chief Complaint: Annual exam  HPI: The patient is a 48 y.o. male with a history of HTN being seen for an annual physical exam.    Hypertension: On amlodipine 5 mg daily.    Rash right leg: First noticed 3 months ago (May), persistent but possibly slightly improving. Itches slightly when the area is hot/sweaty. Non-painful. Has not started a new soap/laundry detergent/lotions around that time. He has used a cream on it with minimal improvement. He is unsure of what type of cream it was but thinks that it was maybe a cream to treat eczema.    Employment: [employer] and [employer] ab[employer]. He is frequently around asbestos but receives annual lung exams as a requirement for his job. He has been having to take time off from his work but will return to work October 6th of this year.    Non smoker - Never smoker  NonETOH - None  Diet: He tries various diets including fasting, or not eating meat 2-3 days.  Exercise: The patient was working out for 3 months but has gained about 10 pounds since he stopped. He started back with working out yesterday. Goal weight 155 lbs.    HEALTH MAINTENANCE:  Cholesterol/labs: Will check CBC, CMP.  Colon CA screening: Patient amenable to scheduling.    Review of Systems   Constitutional:  Negative for fever, malaise/fatigue and weight loss.   HENT:  Negative for congestion and hearing loss.    Eyes:  Negative for blurred vision and redness.   Respiratory:  Negative for cough and shortness of breath.    Cardiovascular:  Negative for chest pain, palpitations and leg swelling.   Gastrointestinal:  Negative for abdominal pain, diarrhea, heartburn, nausea and vomiting.   Genitourinary:  Negative for dysuria, flank pain and hematuria.   Musculoskeletal:  Negative for back pain, joint pain, myalgias and neck pain.   Skin:  Negative for rash.   Neurological:   "Negative for dizziness, tingling, tremors, sensory change, focal weakness, seizures, loss of consciousness, weakness and headaches.   Endo/Heme/Allergies:  Does not bruise/bleed easily.   Psychiatric/Behavioral:  Negative for depression, hallucinations, substance abuse and suicidal ideas. The patient is not nervous/anxious and does not have insomnia.        Medication List with Changes/Refills   Current Medications    AMLODIPINE (NORVASC) 5 MG TABLET    Take 1 tablet (5 mg total) by mouth once daily.    FAMOTIDINE (PEPCID) 20 MG TABLET    Take 1 tablet (20 mg total) by mouth 2 (two) times daily. for 5 days   Discontinued Medications    AMOXICILLIN (AMOXIL) 500 MG CAPSULE    Take 500 mg by mouth every 6 to 8 hours as needed.    CLOTRIMAZOLE (LOTRIMIN) 1 % CREAM    Apply topically 2 (two) times daily. Apply to affected area twice per day       Patient Active Problem List   Diagnosis    Internal derangement of left shoulder    Acute pain of left shoulder    Hypertension    Routine screening for STI (sexually transmitted infection)    Rash           Objective:      /85 (BP Location: Left arm)   Wt 91.6 kg (201 lb 15.1 oz)   BMI 32.59 kg/m²   Estimated body mass index is 32.59 kg/m² as calculated from the following:    Height as of 8/29/22: 5' 6" (1.676 m).    Weight as of this encounter: 91.6 kg (201 lb 15.1 oz).  Physical Exam  HENT:      Head: Normocephalic and atraumatic.   Eyes:      Conjunctiva/sclera: Conjunctivae normal.      Pupils: Pupils are equal, round, and reactive to light.   Cardiovascular:      Rate and Rhythm: Normal rate and regular rhythm.      Heart sounds: No murmur heard.  Pulmonary:      Effort: Pulmonary effort is normal.      Breath sounds: Normal breath sounds. No wheezing or rales.   Abdominal:      General: Bowel sounds are normal.      Palpations: Abdomen is soft.   Musculoskeletal:         General: Normal range of motion.      Cervical back: Normal range of motion and neck supple. "      Comments: Full active and passive ROM to bilateral Knees  No pain on movement  Non-tender to palpation   Skin:     General: Skin is warm and dry.      Findings: Rash present.   Neurological:      Mental Status: He is alert and oriented to person, place, and time.      Gait: Gait is intact.   Psychiatric:         Mood and Affect: Mood and affect normal.         Cognition and Memory: Memory normal.         Judgment: Judgment normal.               Assessment and Plan:     Annual physical exam  [facility] health care  48 yoM with history of hypertension presenting to establish care and for annual exam. Overall he is in great physical health and plans to continue aggressive exercise regimen in order to pass his employment physical exam. He is also motivated to lose weight to a goal of 155 lbs. Encouraged him in this aspect. In terms of health maintenance will check routine CBC, CMP, and Hgb A1c.     - CBC, CMP, Hgb A1c    Rash  Patient reports right lower extremity rash that appeared 3 months ago with no obvious trigger. He reports a history of occasional itchy rashes on his upper extremities which have improved with steroid creams. He has a childhood history of asthma and family history of asthma concerning for possible eczema. He tried a topical steroid on the leg lesion and it reportedly worsened. The lesion is occasionally itchy and it is non-painful. On exam the lesion is flat with mild lichenification of the overlying skin. Possible tinea corpera, will trial topical antifungal cream. Will place referral to dermatology in case no improvement with antifungal.     - Start miconazole 2% cream BID   - Amb referral to dermatology    Primary hypertension  BP well controlled 125/85 in office today.     - Continue excellent diet   - Continue amlodipine 5 mg daily    Follow Up:   Follow up in about 6 months (around 2/29/2024) for Follow-up.     Ramakrishna Hernandez MD  Internal Medicine PGY-2  [facility]  [id number]  [place] [address]

## 2023-09-09 NOTE — ED TRIAGE NOTES
Pt presents to the ED via EMS c/o cardiac arrest. 6 epis given en route via right tibia IO.  EMS reports pt had a witnessed seizure from standing position, fell--struck head (laceration noted to occiput), vomited copious amounts, became pulseless and apneic. Passerby started CPR. EMS reports continued copious vomiting en route, unable to establish an airway. No identification upon arrival, no medical history provided by EMS. . Greyson on pt upon arrival with copious vomit noted around pt's mouth with whole pieces of food. Pt was in vfib, shocked x 1, then went into PEA/arrives in PEA.

## 2023-09-09 NOTE — ED PROVIDER NOTES
Encounter Date: 9/9/2023       History     Chief Complaint   Patient presents with    Cardiac Arrest     Male pt unknown age, unknown PMH, BIBEMS after witnessed collapse in store after reported seizure activity from standing position. CPR was reportedly initiated immediately and EMS arrived 6 minutes later, they report that initial rhythm was VFib which they shocked, then subsequent rhythms were PEA.  They gave 6 rounds of epi, continuous chest compressions via Greyson, unable to obtain definitive airway due to vomitus.  Reported down time 40 minutes.       Review of patient's allergies indicates:  Not on File  No past medical history on file.  No past surgical history on file.  No family history on file.     Review of Systems    Physical Exam     Initial Vitals   BP Pulse Resp Temp SpO2   -- 09/09/23 1545 09/09/23 1608 -- 09/09/23 1545    (!) 39 (!) 0  (!) 49 %      MAP       --                Physical Exam    Constitutional: He appears well-developed and well-nourished.   HENT:   Head: Normocephalic.   Vomitus with chunks of solid food obstructing airway.  Occipital laceration.   Eyes:   Pupils fixed at 6 mm bilaterally   Cardiovascular:            Greyson compression device   Pulmonary/Chest: He has rhonchi.   LMA in oropharynx. Decreased breath sounds bilaterally.   Abdominal: He exhibits distension.   Tympanic     Neurological:   Unresponsive         ED Course   Intubation    Date/Time: 9/9/2023 4:00 PM  Location procedure was performed: [facility] EMERGENCY DEPARTMENT    Performed by: Ellen Cordoba MD  Authorized by: Ellen Cordoba MD  Consent Done: Emergent Situation  Indications: airway protection and respiratory failure  Intubation method: video-assisted  Patient status: unconscious  Preoxygenation: LAUREN/LMA  Pretreatment medications: none  Paralytic: none  Laryngoscope size: Glide 4  Tube size: 7.0 mm  Tube type: cuffed  Number of attempts: 1  Cords visualized: yes  Breath sounds: equal and absent over  the epigastrium and rales/crackles  Cuff inflated: yes  ETT to lip: 21 cm  Tube secured with: ETT felder  Chest x-ray interpreted by: Chest x-ray not obtained as ROSC not achieved.      Critical Care    Date/Time: 9/9/2023 4:00 PM    Performed by: Ellen Cordoba MD  Authorized by: Ellen Cordoba MD  Direct patient critical care time: 20 minutes  Additional history critical care time: 5 minutes  Documentation critical care time: 10 minutes  Consult with family critical care time: 10 minutes  Total critical care time (exclusive of procedural time) : 45 minutes  Critical care time was exclusive of separately billable procedures and treating other patients and teaching time.  Critical care was necessary to treat or prevent imminent or life-threatening deterioration of the following conditions: cardiac failure.  Critical care was time spent personally by me on the following activities: gastric intubation, interpretation of cardiac output measurements, evaluation of patient's response to treatment, examination of patient, obtaining history from patient or surrogate, ordering and performing treatments and interventions, pulse oximetry, re-evaluation of patient's condition and review of old charts.        Labs Reviewed - No data to display       Imaging Results    None          Medications   EPINEPHrine 0.1 mg/mL injection (1 mg Intravenous Given 9/9/23 1606)   sodium bicarbonate 8.4 % (1 mEq/mL) injection (50 mEq Intravenous Given 9/9/23 1603)     Medical Decision Making  Upon arrival patient was unresponsive, with significant vomitus obstructing airway which was quickly suctioned.  I removed LMA and secured airway using glide scope and 7.0 tube, copious vomitus suctioned from ET tube, breath sounds rhonchorous but present bilaterally and no breath sounds heard over epigastrium, oxygenation improved from 40s to 80s before decreasing to 70s again with concern for cuff leak.  ET tube was exchanged to 8.0 using  bougie (Seldinger technique) without difficulty, oxygenation improved to 90s.  OG tube was placed to suction to decompress gastric insufflation from previous LMA.  Unfortunately patient continued to have significant amounts of vomitus occluding airway despite removing chunks of food by hand and secretions by suction, causing desaturations into the 60s. During code 8 doses of epinephrine given, 2 doses of sodium bicarb given, continuous chest compressions performed adequately.  Initial rhythm PEA which quickly devolved into asystole after second round of epi, unable to obtain ROSC, and cardiac standstill confirmed by bedside ultrasound. Time of death declared at 4:08 p.m. I spoke with girlfriend to inform her of patient's death,  at bedside, family offered time with patient.        Amount and/or Complexity of Data Reviewed  Independent Historian: EMS    Risk  Prescription drug management.                               Clinical Impression:   Final diagnoses:  [I46.9] Cardiopulmonary arrest (Primary)        ED Disposition Condition                     Ellen Cordoba MD  [phone]

## 2023-09-10 NOTE — ED NOTES
Received report from BRUNILDA Mckeon and BRUNILDA Chicas. Care assumed by this nurse and BRUNILDA Mcqueen.    Family remains at bedside with patient's body. Pt will be sent to [facility] once family leaves.

## 2023-10-19 ENCOUNTER — CLINICAL SUPPORT (OUTPATIENT)
Dept: ENDOSCOPY | Facility: HOSPITAL | Age: 48
End: 2023-10-19
Payer: MEDICAID

## 2023-10-19 DIAGNOSIS — Z00.00 ANNUAL PHYSICAL EXAM: ICD-10-CM

## 2023-10-19 NOTE — PLAN OF CARE
We attempted to reach you to schedule a procedure . Im sorry that we missed you. Please contact Endoscopy Scheduling Department at [phone]. Thank you

## 2025-02-18 NOTE — TELEPHONE ENCOUNTER
I have reviewed the notes and assessments performed by Mar Carbone, I concur with her documentation of Magan Douglass. I,Yesenia Kelley, did not personally perform any of the services outlined in this documentation.    No

## 2025-04-08 NOTE — TELEPHONE ENCOUNTER
appointment booked with Dr Melendez In 2 [facility] f\u for Dr Merino   if symptoms worsen or fail to improve, for Sinusitis, gum abscess.    Christen Marx, was evaluated through a synchronous (real-time) audio-video encounter. The patient (or guardian if applicable) is aware that this is a billable service, which includes applicable co-pays. This Virtual Visit was conducted with patient's (and/or legal guardian's) consent. Patient identification was verified, and a caregiver was present when appropriate.   The patient was located at Home: [address] [place] [address] [place] [address]  Provider was located at Other: [place]  Confirm you are appropriately licensed, registered, or certified to deliver care in the state where the patient is located as indicated above. If you are not or unsure, please re-schedule the visit: Yes, I confirm.        --TAYO Posadas CNP on 4/8/2025 at 2:10 PM    An electronic signature was used to authenticate this note.